# Patient Record
Sex: FEMALE | Race: WHITE | NOT HISPANIC OR LATINO | Employment: OTHER | URBAN - METROPOLITAN AREA
[De-identification: names, ages, dates, MRNs, and addresses within clinical notes are randomized per-mention and may not be internally consistent; named-entity substitution may affect disease eponyms.]

---

## 2018-12-02 ENCOUNTER — APPOINTMENT (OUTPATIENT)
Dept: GENERAL RADIOLOGY | Facility: HOSPITAL | Age: 83
End: 2018-12-02

## 2018-12-02 ENCOUNTER — APPOINTMENT (OUTPATIENT)
Dept: CT IMAGING | Facility: HOSPITAL | Age: 83
End: 2018-12-02

## 2018-12-02 ENCOUNTER — HOSPITAL ENCOUNTER (INPATIENT)
Facility: HOSPITAL | Age: 83
LOS: 4 days | Discharge: NURSING, REHAB OR LONG-TERM CARE | End: 2018-12-06
Attending: EMERGENCY MEDICINE | Admitting: INTERNAL MEDICINE

## 2018-12-02 DIAGNOSIS — Z74.09 IMPAIRED FUNCTIONAL MOBILITY AND ACTIVITY TOLERANCE: ICD-10-CM

## 2018-12-02 DIAGNOSIS — N39.0 ACUTE UTI: Primary | ICD-10-CM

## 2018-12-02 DIAGNOSIS — R62.7 FAILURE TO THRIVE IN ADULT: ICD-10-CM

## 2018-12-02 DIAGNOSIS — L89.159 PRESSURE INJURY OF SKIN OF SACRAL REGION, UNSPECIFIED INJURY STAGE: ICD-10-CM

## 2018-12-02 DIAGNOSIS — L89.891: ICD-10-CM

## 2018-12-02 PROBLEM — F03.90 DEMENTIA (HCC): Status: ACTIVE | Noted: 2018-12-02

## 2018-12-02 LAB
ALBUMIN SERPL-MCNC: 3.5 G/DL (ref 3.5–5.2)
ALBUMIN/GLOB SERPL: 1.1 G/DL
ALP SERPL-CCNC: 152 U/L (ref 39–117)
ALT SERPL W P-5'-P-CCNC: 9 U/L (ref 1–33)
ANION GAP SERPL CALCULATED.3IONS-SCNC: 9.8 MMOL/L
AST SERPL-CCNC: 21 U/L (ref 1–32)
BACTERIA UR QL AUTO: ABNORMAL /HPF
BASOPHILS # BLD AUTO: 0.01 10*3/MM3 (ref 0–0.2)
BASOPHILS NFR BLD AUTO: 0.1 % (ref 0–1.5)
BILIRUB SERPL-MCNC: 0.6 MG/DL (ref 0.1–1.2)
BILIRUB UR QL STRIP: NEGATIVE
BUN BLD-MCNC: 31 MG/DL (ref 8–23)
BUN/CREAT SERPL: 39.7 (ref 7–25)
CALCIUM SPEC-SCNC: 9 MG/DL (ref 8.6–10.5)
CHLORIDE SERPL-SCNC: 100 MMOL/L (ref 98–107)
CK SERPL-CCNC: 35 U/L (ref 20–180)
CLARITY UR: ABNORMAL
CO2 SERPL-SCNC: 24.2 MMOL/L (ref 22–29)
COLOR UR: YELLOW
CREAT BLD-MCNC: 0.78 MG/DL (ref 0.57–1)
D-LACTATE SERPL-SCNC: 1.3 MMOL/L (ref 0.5–2)
DEPRECATED RDW RBC AUTO: 47.2 FL (ref 37–54)
EOSINOPHIL # BLD AUTO: 0.02 10*3/MM3 (ref 0–0.7)
EOSINOPHIL NFR BLD AUTO: 0.3 % (ref 0.3–6.2)
ERYTHROCYTE [DISTWIDTH] IN BLOOD BY AUTOMATED COUNT: 13.5 % (ref 11.7–13)
GFR SERPL CREATININE-BSD FRML MDRD: 71 ML/MIN/1.73
GLOBULIN UR ELPH-MCNC: 3.2 GM/DL
GLUCOSE BLD-MCNC: 103 MG/DL (ref 65–99)
GLUCOSE UR STRIP-MCNC: NEGATIVE MG/DL
HCT VFR BLD AUTO: 37.9 % (ref 35.6–45.5)
HGB BLD-MCNC: 12 G/DL (ref 11.9–15.5)
HGB UR QL STRIP.AUTO: NEGATIVE
HYALINE CASTS UR QL AUTO: ABNORMAL /LPF
IMM GRANULOCYTES # BLD: 0.03 10*3/MM3 (ref 0–0.03)
IMM GRANULOCYTES NFR BLD: 0.4 % (ref 0–0.5)
INR PPP: 1.07 (ref 0.9–1.1)
KETONES UR QL STRIP: NEGATIVE
LEUKOCYTE ESTERASE UR QL STRIP.AUTO: ABNORMAL
LYMPHOCYTES # BLD AUTO: 0.99 10*3/MM3 (ref 0.9–4.8)
LYMPHOCYTES NFR BLD AUTO: 13.7 % (ref 19.6–45.3)
MCH RBC QN AUTO: 30.3 PG (ref 26.9–32)
MCHC RBC AUTO-ENTMCNC: 31.7 G/DL (ref 32.4–36.3)
MCV RBC AUTO: 95.7 FL (ref 80.5–98.2)
MONOCYTES # BLD AUTO: 0.62 10*3/MM3 (ref 0.2–1.2)
MONOCYTES NFR BLD AUTO: 8.6 % (ref 5–12)
NEUTROPHILS # BLD AUTO: 5.54 10*3/MM3 (ref 1.9–8.1)
NEUTROPHILS NFR BLD AUTO: 76.9 % (ref 42.7–76)
NITRITE UR QL STRIP: NEGATIVE
PH UR STRIP.AUTO: 6 [PH] (ref 5–8)
PLATELET # BLD AUTO: 300 10*3/MM3 (ref 140–500)
PMV BLD AUTO: 8.7 FL (ref 6–12)
POTASSIUM BLD-SCNC: 3.8 MMOL/L (ref 3.5–5.2)
PROT SERPL-MCNC: 6.7 G/DL (ref 6–8.5)
PROT UR QL STRIP: ABNORMAL
PROTHROMBIN TIME: 13.7 SECONDS (ref 11.7–14.2)
RBC # BLD AUTO: 3.96 10*6/MM3 (ref 3.9–5.2)
RBC # UR: ABNORMAL /HPF
REF LAB TEST METHOD: ABNORMAL
SODIUM BLD-SCNC: 134 MMOL/L (ref 136–145)
SP GR UR STRIP: 1.02 (ref 1–1.03)
SQUAMOUS #/AREA URNS HPF: ABNORMAL /HPF
TROPONIN T SERPL-MCNC: <0.01 NG/ML (ref 0–0.03)
UROBILINOGEN UR QL STRIP: ABNORMAL
WBC NRBC COR # BLD: 7.21 10*3/MM3 (ref 4.5–10.7)
WBC UR QL AUTO: ABNORMAL /HPF

## 2018-12-02 PROCEDURE — 85025 COMPLETE CBC W/AUTO DIFF WBC: CPT | Performed by: EMERGENCY MEDICINE

## 2018-12-02 PROCEDURE — 87186 SC STD MICRODIL/AGAR DIL: CPT | Performed by: EMERGENCY MEDICINE

## 2018-12-02 PROCEDURE — 87040 BLOOD CULTURE FOR BACTERIA: CPT | Performed by: EMERGENCY MEDICINE

## 2018-12-02 PROCEDURE — 83605 ASSAY OF LACTIC ACID: CPT | Performed by: EMERGENCY MEDICINE

## 2018-12-02 PROCEDURE — 87077 CULTURE AEROBIC IDENTIFY: CPT | Performed by: EMERGENCY MEDICINE

## 2018-12-02 PROCEDURE — 25010000002 CEFTRIAXONE PER 250 MG: Performed by: EMERGENCY MEDICINE

## 2018-12-02 PROCEDURE — 80053 COMPREHEN METABOLIC PANEL: CPT | Performed by: EMERGENCY MEDICINE

## 2018-12-02 PROCEDURE — 71046 X-RAY EXAM CHEST 2 VIEWS: CPT

## 2018-12-02 PROCEDURE — 70450 CT HEAD/BRAIN W/O DYE: CPT

## 2018-12-02 PROCEDURE — 85610 PROTHROMBIN TIME: CPT | Performed by: EMERGENCY MEDICINE

## 2018-12-02 PROCEDURE — 36415 COLL VENOUS BLD VENIPUNCTURE: CPT

## 2018-12-02 PROCEDURE — 99284 EMERGENCY DEPT VISIT MOD MDM: CPT

## 2018-12-02 PROCEDURE — 82550 ASSAY OF CK (CPK): CPT | Performed by: EMERGENCY MEDICINE

## 2018-12-02 PROCEDURE — 87086 URINE CULTURE/COLONY COUNT: CPT | Performed by: EMERGENCY MEDICINE

## 2018-12-02 PROCEDURE — 84484 ASSAY OF TROPONIN QUANT: CPT | Performed by: EMERGENCY MEDICINE

## 2018-12-02 PROCEDURE — 93005 ELECTROCARDIOGRAM TRACING: CPT | Performed by: EMERGENCY MEDICINE

## 2018-12-02 PROCEDURE — 81001 URINALYSIS AUTO W/SCOPE: CPT | Performed by: EMERGENCY MEDICINE

## 2018-12-02 PROCEDURE — 93010 ELECTROCARDIOGRAM REPORT: CPT | Performed by: INTERNAL MEDICINE

## 2018-12-02 PROCEDURE — 73630 X-RAY EXAM OF FOOT: CPT

## 2018-12-02 RX ORDER — CEFTRIAXONE SODIUM 1 G/50ML
1 INJECTION, SOLUTION INTRAVENOUS ONCE
Status: COMPLETED | OUTPATIENT
Start: 2018-12-02 | End: 2018-12-02

## 2018-12-02 RX ORDER — SODIUM CHLORIDE 0.9 % (FLUSH) 0.9 %
10 SYRINGE (ML) INJECTION AS NEEDED
Status: DISCONTINUED | OUTPATIENT
Start: 2018-12-02 | End: 2018-12-06 | Stop reason: HOSPADM

## 2018-12-02 RX ADMIN — CEFTRIAXONE SODIUM 1 G: 1 INJECTION, SOLUTION INTRAVENOUS at 21:11

## 2018-12-02 RX ADMIN — SODIUM CHLORIDE 1000 ML: 9 INJECTION, SOLUTION INTRAVENOUS at 19:33

## 2018-12-02 NOTE — ED NOTES
Pt reports she fell and broke her wrist 2 weeks ago and has decreased appetite and oral intake since. Pt's family member is worried that she is dehydrated.      Danitza Landis RN  12/02/18 3434

## 2018-12-02 NOTE — ED PROVIDER NOTES
EMERGENCY DEPARTMENT ENCOUNTER    CHIEF COMPLAINT  Chief Complaint: decrease appetite   History given by: pt and son  History limited by: none  Room Number: 15/15  PMD: System, Provider Not In      HPI:  Pt is a 83 y.o. female who presents after the pt has been having decreased appetite for the past 2 weeks. Per son, pt had  fallen and broken her left wrist  2 weeks ago.  It was reduced and cast by hand surgery and it did not need a ORIF. He has noticed that patient has been staying in bed for 2 weeks with decrease appetite and decreased urine output. He continues to say that the pt has not eaten for 2 days from going to 2 pop tart a day to one to none since Friday. Pt drinks a glass of water a day. Pt has been more generally weak and falling. During the fall the pt did not hit her head or have LOC. Pt denies nausea or vomiting. Pt denies smoking or drinking.    Duration:  2 weeks  Onset: gradual  Timing: constant  Location: n/a  Radiation: n/a  Quality: n/a  Intensity/Severity: moderate  Progression: worsened  Associated Symptoms: generalized weakness  Aggravating Factors: na  Alleviating Factors: n/a  Previous Episodes: n/a  Treatment before arrival: n/a    PAST MEDICAL HISTORY  Active Ambulatory Problems     Diagnosis Date Noted   • No Active Ambulatory Problems     Resolved Ambulatory Problems     Diagnosis Date Noted   • No Resolved Ambulatory Problems     Past Medical History:   Diagnosis Date   • Dementia    • Wrist fracture        PAST SURGICAL HISTORY  Past Surgical History:   Procedure Laterality Date   • TOTAL HIP ARTHROPLASTY         FAMILY HISTORY  History reviewed. No pertinent family history.    SOCIAL HISTORY  Social History     Socioeconomic History   • Marital status:      Spouse name: Not on file   • Number of children: Not on file   • Years of education: Not on file   • Highest education level: Not on file   Social Needs   • Financial resource strain: Not on file   • Food insecurity -  worry: Not on file   • Food insecurity - inability: Not on file   • Transportation needs - medical: Not on file   • Transportation needs - non-medical: Not on file   Occupational History   • Not on file   Tobacco Use   • Smoking status: Not on file   Substance and Sexual Activity   • Alcohol use: Not on file   • Drug use: Not on file   • Sexual activity: Not on file   Other Topics Concern   • Not on file   Social History Narrative   • Not on file       ALLERGIES  Patient has no known allergies.    REVIEW OF SYSTEMS  Review of Systems   Constitutional: Positive for appetite change (decrease). Negative for fever.   HENT: Negative for sore throat.    Eyes: Negative.    Respiratory: Negative for cough and shortness of breath.    Cardiovascular: Negative for chest pain.   Gastrointestinal: Negative for abdominal pain, diarrhea and vomiting.   Genitourinary: Negative for dysuria.   Musculoskeletal: Negative for neck pain.   Skin: Negative for rash.   Allergic/Immunologic: Negative.    Neurological: Positive for weakness (generalized ). Negative for numbness and headaches.   Hematological: Negative.    Psychiatric/Behavioral: Negative.    All other systems reviewed and are negative.      PHYSICAL EXAM  ED Triage Vitals [12/02/18 1800]   Temp Heart Rate Resp BP SpO2   97.6 °F (36.4 °C) 85 18 123/64 100 %      Temp src Heart Rate Source Patient Position BP Location FiO2 (%)   Oral Monitor Lying Right arm --       Physical Exam   Constitutional: She is oriented to person, place, and time. No distress.   Bitemporal wasting. cachectic  appearing   HENT:   Head: Normocephalic and atraumatic.   Eyes: EOM are normal. Pupils are equal, round, and reactive to light.   Neck: Normal range of motion. Neck supple.   Cardiovascular: Normal rate, regular rhythm and normal heart sounds.   Pulmonary/Chest: Effort normal and breath sounds normal. No respiratory distress.   Abdominal: Soft. There is no tenderness. There is no rebound and no  guarding.   Musculoskeletal: Normal range of motion. She exhibits no edema.        Left hip: She exhibits tenderness.   Neurological: She is alert and oriented to person, place, and time. She has normal sensation and normal strength.   Skin: Skin is warm and dry. No rash noted.   2 cm ulcer on the left foot with eschar. No erythema or warmth. Sacrum has 6 x 4 cm on pressure ulcer with eschar present.   Psychiatric: Mood and affect normal.   Nursing note and vitals reviewed.      LAB RESULTS  Lab Results (last 24 hours)     Procedure Component Value Units Date/Time    CBC & Differential [[id number]] Collected:  12/02/18 1929    Specimen:  Blood Updated:  12/02/18 1948    Narrative:       The following orders were created for panel order CBC & Differential.  Procedure                               Abnormality         Status                     ---------                               -----------         ------                     CBC Auto Differential[[id number]]        Abnormal            Final result                 Please view results for these tests on the individual orders.    Blood Culture - Blood, Arm, Left [[id number]] Collected:  12/02/18 1929    Specimen:  Blood from Arm, Left Updated:  12/02/18 1940    Lactic Acid, Plasma [[id number]]  (Normal) Collected:  12/02/18 1929    Specimen:  Blood Updated:  12/02/18 2001     Lactate 1.3 mmol/L     CK [[id number]]  (Normal) Collected:  12/02/18 1929    Specimen:  Blood Updated:  12/02/18 2010     Creatine Kinase 35 U/L     Troponin [[id number]]  (Normal) Collected:  12/02/18 1929    Specimen:  Blood Updated:  12/02/18 2010     Troponin T <0.010 ng/mL     Narrative:       Troponin T Reference Ranges:  Less than 0.03 ng/mL:    Negative for AMI  0.03 to 0.09 ng/mL:      Indeterminant for AMI  Greater than 0.09 ng/mL: Positive for AMI    Comprehensive Metabolic Panel [[id number]]  (Abnormal) Collected:  12/02/18 1929    Specimen:  Blood Updated:  12/02/18 2010     Glucose 103  mg/dL      BUN 31 mg/dL      Creatinine 0.78 mg/dL      Sodium 134 mmol/L      Potassium 3.8 mmol/L      Chloride 100 mmol/L      CO2 24.2 mmol/L      Calcium 9.0 mg/dL      Total Protein 6.7 g/dL      Albumin 3.50 g/dL      ALT (SGPT) 9 U/L      AST (SGOT) 21 U/L      Alkaline Phosphatase 152 U/L      Total Bilirubin 0.6 mg/dL      eGFR Non African Amer 71 mL/min/1.73      Globulin 3.2 gm/dL      A/G Ratio 1.1 g/dL      BUN/Creatinine Ratio 39.7     Anion Gap 9.8 mmol/L     Narrative:       The MDRD GFR formula is only valid for adults with stable renal function between ages 18 and 70.    Protime-INR [[id number]]  (Normal) Collected:  12/02/18 1929    Specimen:  Blood from Arm, Right Updated:  12/02/18 1958     Protime 13.7 Seconds      INR 1.07    CBC Auto Differential [[id number]]  (Abnormal) Collected:  12/02/18 1929    Specimen:  Blood Updated:  12/02/18 1948     WBC 7.21 10*3/mm3      RBC 3.96 10*6/mm3      Hemoglobin 12.0 g/dL      Hematocrit 37.9 %      MCV 95.7 fL      MCH 30.3 pg      MCHC 31.7 g/dL      RDW 13.5 %      RDW-SD 47.2 fl      MPV 8.7 fL      Platelets 300 10*3/mm3      Neutrophil % 76.9 %      Lymphocyte % 13.7 %      Monocyte % 8.6 %      Eosinophil % 0.3 %      Basophil % 0.1 %      Immature Grans % 0.4 %      Neutrophils, Absolute 5.54 10*3/mm3      Lymphocytes, Absolute 0.99 10*3/mm3      Monocytes, Absolute 0.62 10*3/mm3      Eosinophils, Absolute 0.02 10*3/mm3      Basophils, Absolute 0.01 10*3/mm3      Immature Grans, Absolute 0.03 10*3/mm3     Urinalysis With Culture If Indicated - Urine, Catheter In/Out [[id number]]  (Abnormal) Collected:  12/02/18 1954    Specimen:  Urine, Catheter In/Out Updated:  12/02/18 2008     Color, UA Yellow     Appearance, UA Cloudy     pH, UA 6.0     Specific Gravity, UA 1.020     Glucose, UA Negative     Ketones, UA Negative     Bilirubin, UA Negative     Blood, UA Negative     Protein, UA Trace     Leuk Esterase, UA Moderate (2+)     Nitrite, UA Negative      Urobilinogen, UA 1.0 E.U./dL    Urinalysis, Microscopic Only - Urine, Catheter In/Out [[id number]]  (Abnormal) Collected:  12/02/18 1954    Specimen:  Urine, Catheter In/Out Updated:  12/02/18 2008     RBC, UA 0-2 /HPF      WBC, UA 31-50 /HPF      Bacteria, UA 4+ /HPF      Squamous Epithelial Cells, UA 0-2 /HPF      Hyaline Casts, UA 0-2 /LPF      Methodology Automated Microscopy    Urine Culture - Urine, Urine, Catheter In/Out [[id number]] Collected:  12/02/18 1954    Specimen:  Urine, Catheter In/Out Updated:  12/02/18 2008    Blood Culture - Blood, Arm, Left [[id number]] Collected:  12/02/18 2101    Specimen:  Blood from Arm, Left Updated:  12/02/18 2111          I ordered the above labs and reviewed the results    RADIOLOGY  CT Head Without Contrast   Final Result   No acute intracranial process.       Radiation dose reduction techniques were utilized, including automated   exposure control and exposure modulation based on body size.       This report was finalized on 12/2/2018 9:15 PM by Dr. Shy Fermin M.D.          XR Chest 2 View   Final Result   Blunting of the right costophrenic angle may reflect scarring or trace   effusion. No definite acute infiltrates are seen, although the patient   does have background emphysematous changes.       This report was finalized on 12/2/2018 8:39 PM by Dr. Shy Fermin M.D.          XR Foot 3+ View Left   Final Result   Soft tissue defect seen overlying the posterior aspect of the calcaneus,   without underlying cortical abnormality to suggest osteomyelitis.       This report was finalized on 12/2/2018 8:37 PM by Dr. Shy Fermin M.D.               I ordered the above noted radiological studies. Interpreted by radiologist. Reviewed by me in PACS.       PROCEDURES  Procedures  EKG          EKG time: 1932  Rhythm/Rate: NSR, 83  P waves and GA: nml  QRS, axis: nml   ST and T waves: nml     Interpreted Contemporaneously by me, independently viewed  No  priors      PROGRESS AND CONSULTS   1916- Ordered  IVF, XR foot, CXR, CT head, UA, blood culture, Lactic acid, CK, Troponin, CMP, Protime, UA, CBC    2040- Ordered Rocephin for infection.    2123-Discussed pt case with Dr. Hayes ([facility]) who will admit the pt to a tele bed.    2150- Rechecked pt. Pt is resting comfortably. Notified pt of labs and radiology results. Discussed the plan to admit the pt to the hospital for work-up and treatment. Pt understands and agrees with the plan, all questions answered.      MEDICAL DECISION MAKING  Results were reviewed/discussed with the patient and they were also made aware of online access. Pt also made aware that some labs, such as cultures, will not be resulted during ER visit and follow up with PMD is necessary.     MDM  Number of Diagnoses or Management Options  Acute UTI:   Failure to thrive in adult:   Pressure injury of dorsum of left foot, stage 1:   Pressure injury of skin of sacral region, unspecified injury stage:      Amount and/or Complexity of Data Reviewed  Clinical lab tests: ordered and reviewed (UA- positive for WBC  Troponin- negative  CK- 35  Troponin- negative)  Tests in the radiology section of CPT®: ordered and reviewed (CT head- nothing acute  XR foot- Soft tissue defect seen overlying the posterior aspect of the calcaneus,  without underlying cortical abnormality to suggest osteomyelitis.  CXR- Blunting of the right costophrenic angle may reflect scarring or trace  effusion. No definite acute infiltrates are seen, although the patient  does have background emphysematous changes.  )  Tests in the medicine section of CPT®: ordered and reviewed (See procedure note for EKG  )  Decide to obtain previous medical records or to obtain history from someone other than the patient: yes  Obtain history from someone other than the patient: yes (Son  )  Discuss the patient with other providers: yes (Dr. Hayes ([facility]))  Independent visualization of images,  tracings, or specimens: yes           DIAGNOSIS  Final diagnoses:   Acute UTI   Failure to thrive in adult   Pressure injury of skin of sacral region, unspecified injury stage   Pressure injury of dorsum of left foot, stage 1       DISPOSITION  ADMISSION    Discussed treatment plan and reason for admission with pt/family and admitting physician.  Pt/family voiced understanding of the plan for admission for further testing/treatment as needed.         Latest Documented Vital Signs:  As of 9:43 PM  BP- 150/75 HR- 67 Temp- 97.6 °F (36.4 °C) (Oral) O2 sat- 100%    --  Documentation assistance provided by mark Mac for Dr. Espitia.  Information recorded by the scribe was done at my direction and has been verified and validated by me.             Delvin Mac  12/02/18 1074       Suman Espitia MD  12/02/18 5625

## 2018-12-03 PROBLEM — E87.1 HYPONATREMIA: Status: ACTIVE | Noted: 2018-12-03

## 2018-12-03 PROBLEM — L89.90 PRESSURE ULCER: Status: ACTIVE | Noted: 2018-12-03

## 2018-12-03 LAB
ANION GAP SERPL CALCULATED.3IONS-SCNC: 13.1 MMOL/L
BUN BLD-MCNC: 25 MG/DL (ref 8–23)
BUN/CREAT SERPL: 50 (ref 7–25)
CALCIUM SPEC-SCNC: 8.5 MG/DL (ref 8.6–10.5)
CHLORIDE SERPL-SCNC: 104 MMOL/L (ref 98–107)
CO2 SERPL-SCNC: 19.9 MMOL/L (ref 22–29)
CREAT BLD-MCNC: 0.5 MG/DL (ref 0.57–1)
DEPRECATED RDW RBC AUTO: 46.8 FL (ref 37–54)
ERYTHROCYTE [DISTWIDTH] IN BLOOD BY AUTOMATED COUNT: 13.6 % (ref 11.7–13)
GFR SERPL CREATININE-BSD FRML MDRD: 118 ML/MIN/1.73
GLUCOSE BLD-MCNC: 87 MG/DL (ref 65–99)
HCT VFR BLD AUTO: 37.6 % (ref 35.6–45.5)
HGB BLD-MCNC: 11.9 G/DL (ref 11.9–15.5)
MCH RBC QN AUTO: 29.8 PG (ref 26.9–32)
MCHC RBC AUTO-ENTMCNC: 31.6 G/DL (ref 32.4–36.3)
MCV RBC AUTO: 94 FL (ref 80.5–98.2)
PLATELET # BLD AUTO: 256 10*3/MM3 (ref 140–500)
PMV BLD AUTO: 8.9 FL (ref 6–12)
POTASSIUM BLD-SCNC: 3.7 MMOL/L (ref 3.5–5.2)
RBC # BLD AUTO: 4 10*6/MM3 (ref 3.9–5.2)
SODIUM BLD-SCNC: 137 MMOL/L (ref 136–145)
WBC NRBC COR # BLD: 7.09 10*3/MM3 (ref 4.5–10.7)

## 2018-12-03 PROCEDURE — 25010000002 CEFTRIAXONE PER 250 MG: Performed by: NURSE PRACTITIONER

## 2018-12-03 PROCEDURE — 36415 COLL VENOUS BLD VENIPUNCTURE: CPT | Performed by: NURSE PRACTITIONER

## 2018-12-03 PROCEDURE — 97165 OT EVAL LOW COMPLEX 30 MIN: CPT

## 2018-12-03 PROCEDURE — 97162 PT EVAL MOD COMPLEX 30 MIN: CPT

## 2018-12-03 PROCEDURE — 85027 COMPLETE CBC AUTOMATED: CPT | Performed by: NURSE PRACTITIONER

## 2018-12-03 PROCEDURE — 97110 THERAPEUTIC EXERCISES: CPT

## 2018-12-03 PROCEDURE — 80048 BASIC METABOLIC PNL TOTAL CA: CPT | Performed by: NURSE PRACTITIONER

## 2018-12-03 RX ORDER — ACETAMINOPHEN 325 MG/1
650 TABLET ORAL EVERY 4 HOURS PRN
Status: DISCONTINUED | OUTPATIENT
Start: 2018-12-03 | End: 2018-12-06 | Stop reason: HOSPADM

## 2018-12-03 RX ORDER — SODIUM CHLORIDE 9 MG/ML
50 INJECTION, SOLUTION INTRAVENOUS CONTINUOUS
Status: DISCONTINUED | OUTPATIENT
Start: 2018-12-03 | End: 2018-12-04

## 2018-12-03 RX ORDER — ONDANSETRON 4 MG/1
4 TABLET, ORALLY DISINTEGRATING ORAL EVERY 6 HOURS PRN
Status: DISCONTINUED | OUTPATIENT
Start: 2018-12-03 | End: 2018-12-06 | Stop reason: HOSPADM

## 2018-12-03 RX ORDER — ONDANSETRON 2 MG/ML
4 INJECTION INTRAMUSCULAR; INTRAVENOUS EVERY 6 HOURS PRN
Status: DISCONTINUED | OUTPATIENT
Start: 2018-12-03 | End: 2018-12-06 | Stop reason: HOSPADM

## 2018-12-03 RX ORDER — SODIUM CHLORIDE 0.9 % (FLUSH) 0.9 %
3 SYRINGE (ML) INJECTION EVERY 12 HOURS SCHEDULED
Status: DISCONTINUED | OUTPATIENT
Start: 2018-12-03 | End: 2018-12-06 | Stop reason: HOSPADM

## 2018-12-03 RX ORDER — CEFTRIAXONE SODIUM 1 G/50ML
1 INJECTION, SOLUTION INTRAVENOUS EVERY 24 HOURS
Status: COMPLETED | OUTPATIENT
Start: 2018-12-03 | End: 2018-12-04

## 2018-12-03 RX ORDER — NITROGLYCERIN 0.4 MG/1
0.4 TABLET SUBLINGUAL
Status: DISCONTINUED | OUTPATIENT
Start: 2018-12-03 | End: 2018-12-06 | Stop reason: HOSPADM

## 2018-12-03 RX ORDER — SODIUM CHLORIDE 0.9 % (FLUSH) 0.9 %
1-10 SYRINGE (ML) INJECTION AS NEEDED
Status: DISCONTINUED | OUTPATIENT
Start: 2018-12-03 | End: 2018-12-06 | Stop reason: HOSPADM

## 2018-12-03 RX ORDER — ONDANSETRON 4 MG/1
4 TABLET, FILM COATED ORAL EVERY 6 HOURS PRN
Status: DISCONTINUED | OUTPATIENT
Start: 2018-12-03 | End: 2018-12-06 | Stop reason: HOSPADM

## 2018-12-03 RX ADMIN — SODIUM CHLORIDE 100 ML/HR: 9 INJECTION, SOLUTION INTRAVENOUS at 01:30

## 2018-12-03 RX ADMIN — COLLAGENASE SANTYL: 250 OINTMENT TOPICAL at 14:00

## 2018-12-03 RX ADMIN — SODIUM CHLORIDE, PRESERVATIVE FREE 3 ML: 5 INJECTION INTRAVENOUS at 01:30

## 2018-12-03 RX ADMIN — SODIUM CHLORIDE, PRESERVATIVE FREE 3 ML: 5 INJECTION INTRAVENOUS at 10:26

## 2018-12-03 RX ADMIN — SODIUM CHLORIDE, PRESERVATIVE FREE 3 ML: 5 INJECTION INTRAVENOUS at 21:00

## 2018-12-03 RX ADMIN — DAKIN'S SOLUTION 0.125% (QUARTER STRENGTH) 946 ML: 0.12 SOLUTION at 20:50

## 2018-12-03 RX ADMIN — DAKIN'S SOLUTION 0.125% (QUARTER STRENGTH) 946 ML: 0.12 SOLUTION at 14:00

## 2018-12-03 RX ADMIN — CEFTRIAXONE SODIUM 1 G: 1 INJECTION, SOLUTION INTRAVENOUS at 20:50

## 2018-12-03 RX ADMIN — COLLAGENASE SANTYL: 250 OINTMENT TOPICAL at 20:50

## 2018-12-03 NOTE — NURSING NOTE
[facility] report filled out, called, and faxed to [facility].  notified. See report for evidence.  Case #[id number]

## 2018-12-03 NOTE — THERAPY EVALUATION
"Acute Care - Occupational Therapy Initial Evaluation  [facility]     Patient Name: Sarah Jeffrey  : 1935  MRN: [id number]  Today's Date: 12/3/2018  Onset of Illness/Injury or Date of Surgery: 18     Referring Physician: Mellisa    Admit Date: 2018       ICD-10-CM ICD-9-CM   1. Acute UTI N39.0 599.0   2. Failure to thrive in adult R62.7 783.7   3. Pressure injury of skin of sacral region, unspecified injury stage L89.159 707.03     707.20   4. Pressure injury of dorsum of left foot, stage 1 L89.891 707.09     707.21   5. Impaired functional mobility and activity tolerance Z74.09 V49.89     Patient Active Problem List   Diagnosis   • Acute UTI   • Dementia   • Pressure ulcer   • Hyponatremia     Past Medical History:   Diagnosis Date   • Dementia    • Wrist fracture      Past Surgical History:   Procedure Laterality Date   • TOTAL HIP ARTHROPLASTY            OT ASSESSMENT FLOWSHEET (last 72 hours)      Occupational Therapy Evaluation     Row Name 18 1009                   OT Evaluation Time/Intention    Subjective Information  complains of;weakness;fatigue  -LE        Document Type  evaluation;therapy note (daily note)  -LE        Patient Effort  fair  -LE        Comment  encouragement and explanation of activity benefits given  -LE           General Information    Patient Profile Reviewed?  yes  -LE        Patient Observations  lethargic  -LE        General Observations of Patient  sidelying R in bed, very thin, frail.  room dark  -LE        Prior Level of Function  -- pt reports walk to BR/ADL on own, ? reliable historian.   -LE        Barriers to Rehab  -- per son to nsg: pt stays in bed/recliner mostly  -LE           Cognitive Assessment/Intervention- PT/OT    Orientation Status (Cognition)  oriented to;person month, \"Rastafari\"  -LE        Follows Commands (Cognition)  follows two step commands;0-24% accuracy;delayed response/completion;increased processing time needed;initiation " impaired;physical/tactile prompts required;verbal cues/prompting required;repetition of directions required  -LE        Cognitive Assessment/Intervention Comment  pt reluctant to get up, minimal talking  -LE           Bed Mobility Assessment/Treatment    Supine-Sit [place] (Bed Mobility)  moderate assist (50% patient effort)  -LE        Sit-Supine [place] (Bed Mobility)  moderate assist (50% patient effort)  -LE        Comment (Bed Mobility)  assist pt, care taken to avoid pressure areas  -LE           Functional Mobility    Functional Mobility- Ind. Level  not tested;unable to perform;not appropriate to assess due poor standing balance and pt decline to attempt  -LE           Sit-Stand Transfer    Sit-Stand [place] (Transfers)  moderate assist (50% patient effort)  -LE        Assistive Device (Sit-Stand Transfers)  -- B feet blocked to keep from extending.  2 attempt to achieve  -LE           Stand-Sit Transfer    Stand-Sit [place] (Transfers)  moderate assist (50% patient effort)  -LE           ADL Assessment/Intervention    BADL Assessment/Intervention  bathing;upper body dressing;lower body dressing;grooming;feeding;toileting  -LE        Additional Documentation  -- pt receiving assist all ADL, RN states bedpan used.   -LE           Lower Body Dressing Assessment/Training    Comment (Lower Body Dressing)  unable to reach feet.   -LE           Toileting Assessment/Training    Comment (Toileting)  no brief on. RN states using bedpan.   -LE           General ROM    GENERAL ROM COMMENTS  L wrist cast (h/o fall 2 weeks ago).  grossly 2/3 AROM distal, 1/2 B shld.    -LE           MMT (Manual Muscle Testing)    General MMT Comments  -- minimal effort to raise arms  or take resistance.   -LE           Static Sitting Balance    Level of [place] (Unsupported Sitting, Static Balance)  minimal assist, 75% patient effort posterior and R lean, tries to lay self back down while seat  -LE        Sitting  Position (Unsupported Sitting, Static Balance)  sitting on edge of bed  -LE           Static Standing Balance    Level of [place] (Supported Standing, Static Balance)  moderate assist, 50 to 74% patient effort brief stand at EOB  -LE           Positioning and Restraints    Pre-Treatment Position  in bed  -LE        Post Treatment Position  bed  -LE        In Bed  side lying right;call light within reach;encouraged to call for assist;pillow between legs;R waffle boot WOCN took off L boot before OT.  pt lay on waffle cushion  -LE           Pain Assessment    Additional Documentation  Pain Scale: FACES Pre/Post-Treatment (Group)  -LE           Pain Scale: FACES Pre/Post-Treatment    Pain: FACES Scale, Pretreatment  4-->hurts little more  -LE        Pain: FACES Scale, Post-Treatment  4-->hurts little more brief during bed mobility.  indicates leg pain  -LE        Pre/Post Treatment Pain Comment  reposition and rest  -LE           Wound 12/02/18 2045 Other (See comments) midline coccyx pressure injury    Wound - Properties Group Date first assessed: 12/02/18  -GC Time first assessed: 2045 -GC Present On Admission : yes  -GC Side: Other (See comments)  -GC Orientation: midline  -GC Location: coccyx  -GC Type: pressure injury  -GC Stage, Pressure Injury: unstageable  -GC       Wound 12/02/18 2046 Right heel pressure injury    Wound - Properties Group Date first assessed: 12/02/18  -GC Time first assessed: 2046  -GC Present On Admission : yes  -GC Side: Right  -GC Location: heel  -GC Type: pressure injury  -GC Stage, Pressure Injury: unstageable  -GC       Wound 12/03/18 0139 Left heel pressure injury    Wound - Properties Group Date first assessed: 12/03/18  -EN Time first assessed: 0139  -EN Present On Admission : yes;picture taken  -EN Side: Left  -EN Location: heel  -EN Type: pressure injury  -EN Stage, Pressure Injury: unstageable  -EN       Plan of Care Review    Plan of Care Reviewed With  patient  -LE            Clinical Impression (OT)    OT Diagnosis  need for assist with personal care  -LE        Patient/Family Goals Statement (OT Eval)  increase function  -LE        Criteria for Skilled Therapeutic Interventions Met (OT Eval)  treatment indicated;yes  -LE        Rehab Potential (OT Eval)  fair, will monitor progress closely  -LE        Therapy Frequency (OT Eval)  5 times/wk  -LE        Care Plan Review (OT)  evaluation/treatment results reviewed;care plan/treatment goals reviewed  -LE        Anticipated Equipment Needs at Discharge (OT)  bedside commode;walker  -LE        Anticipated Discharge Disposition (OT)  -- pending progress, clarify prior level, family support  -LE           Vital Signs    O2 Delivery Pre Treatment  supplemental O2  -LE        O2 Delivery Intra Treatment  supplemental O2  -LE        O2 Delivery Post Treatment  supplemental O2  -LE        Pre Patient Position  Side Lying  -LE        Intra Patient Position  Standing  -LE        Post Patient Position  Side Lying  -LE        Activity Duration  -- frail, minimal activity, pt reluctant to get up  -LE           Planned OT Interventions    Planned Therapy Interventions (OT Eval)  activity tolerance training;adaptive equipment training;BADL retraining;functional balance retraining;transfer/mobility retraining;strengthening exercise  -LE           OT Goals    Transfer Goal Selection (OT)  transfer, OT goal 1  -LE        Bathing Goal Selection (OT)  bathing, OT goal 1  -LE        Dressing Goal Selection (OT)  dressing, OT goal 1  -LE        Toileting Goal Selection (OT)  toileting, OT goal 1  -LE           Transfer Goal 1 (OT)    Activity/Assistive Device (Transfer Goal 1, OT)  sit-to-stand/stand-to-sit;bed-to-chair/chair-to-bed;toilet;commode, 3-in-1;walker, rolling  -LE        [place] Level/Cues Needed (Transfer Goal 1, OT)  minimum assist (75% or more patient effort)  -LE        Time Frame (Transfer Goal 1, OT)  1 week  -LE         Progress/Outcome (Transfer Goal 1, OT)  goal ongoing  -LE           Bathing Goal 1 (OT)    Activity/Assistive Device (Bathing Goal 1, OT)  upper body bathing;lower body bathing  -LE        [place] Level/Cues Needed (Bathing Goal 1, OT)  moderate assist (50-74% patient effort)  -LE        Time Frame (Bathing Goal 1, OT)  1 week  -LE        Progress/Outcomes (Bathing Goal 1, OT)  goal ongoing  -LE           Dressing Goal 1 (OT)    Activity/Assistive Device (Dressing Goal 1, OT)  lower body dressing  -LE        [place]/Cues Needed (Dressing Goal 1, OT)  moderate assist (50-74% patient effort)  -LE        Time Frame (Dressing Goal 1, OT)  1 week  -LE        Progress/Outcome (Dressing Goal 1, OT)  goal ongoing  -LE           Toileting Goal 1 (OT)    Activity/Device (Toileting Goal 1, OT)  perform perineal hygiene;commode, 3-in-1  -LE        [place] Level/Cues Needed (Toileting Goal 1, OT)  minimum assist (75% or more patient effort)  -LE        Time Frame (Toileting Goal 1, OT)  1 week  -LE        Progress/Outcome (Toileting Goal 1, OT)  goal ongoing  -LE          User Key  (r) = Recorded By, (t) = Taken By, (c) = Cosigned By    Initials Name Effective Dates    Sofiya Collins, OTR 06/08/18 -     Ana Blackwood, JOHN 10/30/17 -     Nelia Stapleton, JOHN 07/19/17 -          Occupational Therapy Education     Title: PT OT SLP Therapies (In Progress)     Topic: Occupational Therapy (In Progress)     Point: Precautions (Done)     Description: Instruct learner(s) on prescribed precautions during self-care and functional transfers.    Learning Progress Summary           Patient Acceptance, E, VU by BRIT at 12/3/2018 10:09 AM    Comment:  Ed benefits of activity.    Acceptance, E,VALE, DU by BIRT at 12/3/2018 10:08 AM    Comment:  Ed role of OT in acute care.  Ed posture with standing to improve stability and pt responds to verbal and tactile cueing                   Point: Body mechanics (Done)      Description: Instruct learner(s) on proper positioning and spine alignment during self-care, functional mobility activities and/or exercises.    Learning Progress Summary           Patient Acceptance, VALE BLANCA, DU by BRIT at 12/3/2018 10:08 AM    Comment:  Ed role of OT in acute care.  Ed posture with standing to improve stability and pt responds to verbal and tactile cueing                               User Key     Initials Effective Dates Name Provider Type Discipline    BRIT 06/08/18 -  Sofiya High, OTR Occupational Therapist OT                  OT Recommendation and Plan  Outcome Summary/Treatment Plan (OT)  Anticipated Equipment Needs at Discharge (OT): bedside commode, walker  Anticipated Discharge Disposition (OT): (pending progress, clarify prior level, family support)  Planned Therapy Interventions (OT Eval): activity tolerance training, adaptive equipment training, BADL retraining, functional balance retraining, transfer/mobility retraining, strengthening exercise  Therapy Frequency (OT Eval): 5 times/wk  Plan of Care Review  Plan of Care Reviewed With: patient  Plan of Care Reviewed With: patient  Outcome Summary: Pt requires encouragement to get up to EOB with max A.  Pt frail, not initiating tasks.  Pt may benefit from skilled OT to increase safety and independence.     Outcome Measures     Row Name 12/03/18 1002 12/03/18 1000 12/03/18 0900       How much help from another person do you currently need...    Turning from your back to your side while in flat bed without using bedrails?  --  --  2  -PC    Moving from lying on back to sitting on the side of a flat bed without bedrails?  --  --  2  -PC    Moving to and from a bed to a chair (including a wheelchair)?  --  --  2  -PC    Standing up from a chair using your arms (e.g., wheelchair, bedside chair)?  --  --  2  -PC    Climbing 3-5 steps with a railing?  --  --  1  -PC    To walk in hospital room?  --  --  1  -PC    AM-PAC 6 Clicks Score  --  --  10   -PC       How much help from another is currently needed...    Putting on and taking off regular lower body clothing?  1  -LE  --  --    Bathing (including washing, rinsing, and drying)  1  -LE  --  --    Toileting (which includes using toilet bed pan or urinal)  1  -LE  --  --    Putting on and taking off regular upper body clothing  1  -LE  --  --    Taking care of personal grooming (such as brushing teeth)  1  -LE  --  --    Eating meals  2  -LE  --  --    Score  7  -LE  --  --       Functional Assessment    Outcome Measure Options  --  AM-PAC 6 Clicks Daily Activity (OT)  -LE  AM-PAC 6 Clicks Basic Mobility (PT)  -PC      User Key  (r) = Recorded By, (t) = Taken By, (c) = Cosigned By    Initials Name Provider Type    Sofiya Collins OTR Occupational Therapist    PC Diana Arias, PT Physical Therapist          Time Calculation:   Time Calculation- OT     Row Name 12/03/18 1019             Time Calculation- OT    OT Start Time  0947  -LE      OT Stop Time  0958  -LE      OT Time Calculation (min)  11 min  -LE      Total Timed Code Minutes- OT  11 minute(s)  -LE      OT Received On  12/03/18  -      OT Goal Re-Cert Due Date  12/10/18  -        User Key  (r) = Recorded By, (t) = Taken By, (c) = Cosigned By    Initials Name Provider Type    Sofiya Collins OTR Occupational Therapist        Therapy Suggested Charges     Code   Minutes Charges    None           Therapy Charges for Today     Code Description Service Date Service Provider Modifiers Qty    [id number]  OT EVAL LOW COMPLEXITY 2 12/3/2018 Sofiya High OTR GO 1               ANA Cartwright  12/3/2018

## 2018-12-03 NOTE — H&P
Name: Sarah Jeffrey ADMIT: 2018   : 1935  PCP: System, Provider Not In    MRN: [id number] LOS: 1 days   AGE/SEX: 83 y.o. female  ROOM: N535/1     Chief Complaint   Patient presents with   • Anorexia       Subjective     Ms. Jeffrey is a 83 y.o. female with a history of dementia that presents to [facility] with family complaints of increased confusion. Patient lives with son at home and patient's son who is currently at bedside reports that his mother hasn't been eating for the past 2 days and has hpill confused, which is not her baseline. She is oriented to person and place, though not situation or date. She also presents to ED today with cast to left arm from previous fall about 2 weeks ago. Additionally, son reports new onset of pressure ulcers on her heels and coccyx. He states that she usually lays in bed or sits in recliner at home, but otherwise is very immobile. Patient is hard of hearing and with confusion, is unable to answer any ROS questions. Upon further workup done in ED tonight, she was found to have UTI.        Past Medical History:   Diagnosis Date   • Dementia    • Wrist fracture      Past Surgical History:   Procedure Laterality Date   • TOTAL HIP ARTHROPLASTY       History reviewed. No pertinent family history.  Social History     Tobacco Use   • Smoking status: Never Smoker   Substance Use Topics   • Alcohol use: No     Frequency: Never   • Drug use: Not on file     No medications prior to admission.     Allergies:  No Known Allergies    Review of Systems   Unable to perform ROS: Mental status change        Objective    Vital Signs  Temp:  [96.3 °F (35.7 °C)-97.6 °F (36.4 °C)] 96.3 °F (35.7 °C)  Heart Rate:  [65-85] 69  Resp:  [18] 18  BP: (123-150)/(64-81) 147/81  SpO2:  [97 %-100 %] 97 %  on  Flow (L/min):  [1-2] 1;   Device (Oxygen Therapy): nasal cannula  Body mass index is 15.62 kg/m².    Physical Exam   Constitutional: She appears cachectic. She appears ill. No  distress.   HENT:   Head: Normocephalic and atraumatic.   Mouth/Throat: Mucous membranes are dry.   Eyes: EOM are normal.   Neck: Normal range of motion. Neck supple.   Cardiovascular: Normal rate, regular rhythm and intact distal pulses.   Pulmonary/Chest: Effort normal. No respiratory distress. She has decreased breath sounds in the right lower field and the left lower field.   Abdominal: Soft. Bowel sounds are normal. She exhibits no distension. There is no tenderness.   Sunken abdomen   Musculoskeletal: Normal range of motion. She exhibits no edema or tenderness.   Left arm in cast from previous fall   Neurological: She is alert. She is disoriented.   Skin: Skin is warm and dry. Bruising noted. She is not diaphoretic.   Skin dry and flaky, unstageable pressure ulcers noted to coccyx and bilateral heels   Psychiatric: Cognition and memory are impaired.   Nursing note and vitals reviewed.      Results Review:   I reviewed the patient's new clinical results including all labs and xrays.    Lab Results (last 24 hours)     Procedure Component Value Units Date/Time    CBC & Differential [[id number]] Collected:  12/02/18 1929    Specimen:  Blood Updated:  12/02/18 1948    Narrative:       The following orders were created for panel order CBC & Differential.  Procedure                               Abnormality         Status                     ---------                               -----------         ------                     CBC Auto Differential[[id number]]        Abnormal            Final result                 Please view results for these tests on the individual orders.    Blood Culture - Blood, Arm, Left [[id number]] Collected:  12/02/18 1929    Specimen:  Blood from Arm, Left Updated:  12/02/18 1940    Lactic Acid, Plasma [[id number]]  (Normal) Collected:  12/02/18 1929    Specimen:  Blood Updated:  12/02/18 2001     Lactate 1.3 mmol/L     CK [[id number]]  (Normal) Collected:  12/02/18 1929    Specimen:  Blood  Updated:  12/02/18 2010     Creatine Kinase 35 U/L     Troponin [[id number]]  (Normal) Collected:  12/02/18 1929    Specimen:  Blood Updated:  12/02/18 2010     Troponin T <0.010 ng/mL     Narrative:       Troponin T Reference Ranges:  Less than 0.03 ng/mL:    Negative for AMI  0.03 to 0.09 ng/mL:      Indeterminant for AMI  Greater than 0.09 ng/mL: Positive for AMI    Comprehensive Metabolic Panel [[id number]]  (Abnormal) Collected:  12/02/18 1929    Specimen:  Blood Updated:  12/02/18 2010     Glucose 103 mg/dL      BUN 31 mg/dL      Creatinine 0.78 mg/dL      Sodium 134 mmol/L      Potassium 3.8 mmol/L      Chloride 100 mmol/L      CO2 24.2 mmol/L      Calcium 9.0 mg/dL      Total Protein 6.7 g/dL      Albumin 3.50 g/dL      ALT (SGPT) 9 U/L      AST (SGOT) 21 U/L      Alkaline Phosphatase 152 U/L      Total Bilirubin 0.6 mg/dL      eGFR Non African Amer 71 mL/min/1.73      Globulin 3.2 gm/dL      A/G Ratio 1.1 g/dL      BUN/Creatinine Ratio 39.7     Anion Gap 9.8 mmol/L     Narrative:       The MDRD GFR formula is only valid for adults with stable renal function between ages 18 and 70.    Protime-INR [[id number]]  (Normal) Collected:  12/02/18 1929    Specimen:  Blood from Arm, Right Updated:  12/02/18 1958     Protime 13.7 Seconds      INR 1.07    CBC Auto Differential [[id number]]  (Abnormal) Collected:  12/02/18 1929    Specimen:  Blood Updated:  12/02/18 1948     WBC 7.21 10*3/mm3      RBC 3.96 10*6/mm3      Hemoglobin 12.0 g/dL      Hematocrit 37.9 %      MCV 95.7 fL      MCH 30.3 pg      MCHC 31.7 g/dL      RDW 13.5 %      RDW-SD 47.2 fl      MPV 8.7 fL      Platelets 300 10*3/mm3      Neutrophil % 76.9 %      Lymphocyte % 13.7 %      Monocyte % 8.6 %      Eosinophil % 0.3 %      Basophil % 0.1 %      Immature Grans % 0.4 %      Neutrophils, Absolute 5.54 10*3/mm3      Lymphocytes, Absolute 0.99 10*3/mm3      Monocytes, Absolute 0.62 10*3/mm3      Eosinophils, Absolute 0.02 10*3/mm3      Basophils, Absolute  0.01 10*3/mm3      Immature Grans, Absolute 0.03 10*3/mm3     Urinalysis With Culture If Indicated - Urine, Catheter In/Out [[id number]]  (Abnormal) Collected:  12/02/18 1954    Specimen:  Urine, Catheter In/Out Updated:  12/02/18 2008     Color, UA Yellow     Appearance, UA Cloudy     pH, UA 6.0     Specific Gravity, UA 1.020     Glucose, UA Negative     Ketones, UA Negative     Bilirubin, UA Negative     Blood, UA Negative     Protein, UA Trace     Leuk Esterase, UA Moderate (2+)     Nitrite, UA Negative     Urobilinogen, UA 1.0 E.U./dL    Urinalysis, Microscopic Only - Urine, Catheter In/Out [[id number]]  (Abnormal) Collected:  12/02/18 1954    Specimen:  Urine, Catheter In/Out Updated:  12/02/18 2008     RBC, UA 0-2 /HPF      WBC, UA 31-50 /HPF      Bacteria, UA 4+ /HPF      Squamous Epithelial Cells, UA 0-2 /HPF      Hyaline Casts, UA 0-2 /LPF      Methodology Automated Microscopy    Urine Culture - Urine, Urine, Catheter In/Out [[id number]] Collected:  12/02/18 1954    Specimen:  Urine, Catheter In/Out Updated:  12/02/18 2008    Blood Culture - Blood, Arm, Left [[id number]] Collected:  12/02/18 2101    Specimen:  Blood from Arm, Left Updated:  12/02/18 2111          CT Head Without Contrast   Final Result   No acute intracranial process.       Radiation dose reduction techniques were utilized, including automated   exposure control and exposure modulation based on body size.       This report was finalized on 12/2/2018 9:15 PM by Dr. Shy Fermin M.D.          XR Chest 2 View   Final Result   Blunting of the right costophrenic angle may reflect scarring or trace   effusion. No definite acute infiltrates are seen, although the patient   does have background emphysematous changes.       This report was finalized on 12/2/2018 8:39 PM by Dr. Shy Fermin M.D.          XR Foot 3+ View Left   Final Result   Soft tissue defect seen overlying the posterior aspect of the calcaneus,   without underlying  cortical abnormality to suggest osteomyelitis.       This report was finalized on 12/2/2018 8:37 PM by Dr. Shy Fermin M.D.            Assessment/Plan      Active Hospital Problems    Diagnosis Date Noted   • **Acute UTI [N39.0] 12/02/2018   • Pressure ulcer [L89.90] 12/03/2018   • Hyponatremia [E87.1] 12/03/2018   • Dementia [F03.90] 12/02/2018      Resolved Hospital Problems   No resolved problems to display.     UTI  -likely cause of acute confusion  -urine culture pending  -continue IV Rocephin daily  -IVF overnight  -consult PT/OT with complaints of previous falls, weakness    Pressure Ulcers  -unstageable pressure ulcers to coccyx and bilateral heels  -will have wound evaluate and give recs  -nutrition consult as well due to poor PO intake and wounds    VTE Ppx  -SCDs    CODE status  -full    I discussed the patients findings and my recommendations with patient and family.    ELISEO Nix  12/02/18  11:50 PM    Addendum: I have reviewed the history and plan as obtained by ELISEO Hernandez. I have personally examined the patient. My exam confirms her physical findings and I agree with the plan as listed above, with the addition to the following:      Dolores Hayes MD  [facility]

## 2018-12-03 NOTE — CONSULTS
"Adult Nutrition  Assessment/PES    Patient Name:  Sarah Jeffrey  YOB: 1935  MRN: [id number]  Admit Date:  12/2/2018    Assessment Date:  12/3/2018    Comments:  Nutrition assessment and MSA completed. Son reports pt with decrease food and liquids at home. Pressure injuries noted. Po encouraged, and will offer supplements/snacks.    Reason for Assessment     Row Name 12/03/18 0834          Reason for Assessment    Reason For Assessment  identified at risk by screening criteria;physician consult     Diagnosis  -- UTI, dementia, hyponatremia, fx         Nutrition/Diet History     Row Name 12/03/18 0835          Nutrition/Diet History    Typical Food/Fluid Intake  s/p fall and fx wrist 2 weeks ago, has been with decrease appetite, decrease fluids, and less mobile at home since then     Food Preferences  coffee         Anthropometrics     Row Name 12/03/18 0836 12/03/18 0051       Anthropometrics    Height  152.4 cm (60\")  152.4 cm (60\")    Weight  36.3 kg (80 lb 0 oz)  36.3 kg (80 lb)       Ideal Body Weight (IBW)    Ideal Body Weight (IBW) (kg)  45.86  45.86    % Ideal Body Weight  79.13  79.13       Body Mass Index (BMI)    BMI (kg/m2)  15.66  15.66    BMI Assessment  BMI less than 16: protein-energy malnutrition grade III  --       IBW Adjustment, Para/Tetraplegia    5% Adjustment, Para (IBW)  43.57  43.57    10% Adjustment, Para (IBW)  41.27  41.27    10% Adjustment, Tetra (IBW)  41.27  41.27    15% Adjustment, Tetra (IBW)  38.98  38.98        Labs/Tests/Procedures/Meds     Row Name 12/03/18 0837          Labs/Procedures/Meds    Lab Results Reviewed  reviewed     Lab Results Comments  na, bun, cr,        Diagnostic Tests/Procedures    Diagnostic Test/Procedure Reviewed  reviewed        Medications    Pertinent Medications Reviewed  reviewed     Pertinent Medications Comments  rocephin,         Physical Findings     Row Name 12/03/18 0830          Physical Findings    Overall Physical Appearance  loss of " "subcutaneous fat;loss of muscle mass;underweight     Skin  pressure injury coccyx, bilateral heels         Estimated/Assessed Needs     Row Name 12/03/18 0839 12/03/18 0836       Calculation Measurements    Weight Used For Calculations  36.3 kg (80 lb 0.4 oz)  --    Height  --  152.4 cm (60\")       Estimated/Assessed Needs    Additional Documentation  -- 1450kcals, 55 gms pro, 1090cc fluid  --       KCAL/KG    14 Kcal/Kg (kcal)  508.2  --    15 Kcal/Kg (kcal)  544.5  --    18 Kcal/Kg (kcal)  653.4  --    20 Kcal/Kg (kcal)  726  --    25 Kcal/Kg (kcal)  907.5  --    30 Kcal/Kg (kcal)  1089  --    35 Kcal/Kg (kcal)  1270.5  --    40 Kcal/Kg (kcal)  1452  --    45 Kcal/Kg (kcal)  1633.5  --    50 Kcal/Kg (kcal)  1815  --       Van Etten-St. Jeor Equation    RMR (Van Etten-St. Jeor Equation)  739.5  --       Fluid Requirements    Kayli-Vernell Method (over 20 kg)  2226  --    Row Name 12/03/18 0051          Calculation Measurements    Height  152.4 cm (60\")         Nutrition Prescription Ordered     Row Name 12/03/18 0840          Nutrition Prescription PO    Current PO Diet  Regular         Evaluation of Received Nutrient/Fluid Intake     Row Name 12/03/18 0839 12/03/18 0836       Calculation Measurements    Weight Used For Calculations  36.3 kg (80 lb 0.4 oz)  --    Height  --  152.4 cm (60\")    Row Name 12/03/18 0051          Calculation Measurements    Height  152.4 cm (60\")         Evaluation of Prescribed Nutrient/Fluid Intake     Row Name 12/03/18 0839 12/03/18 0836       Calculation Measurements    Weight Used For Calculations  36.3 kg (80 lb 0.4 oz)  --    Height  --  152.4 cm (60\")    Row Name 12/03/18 0051          Calculation Measurements    Height  152.4 cm (60\")         Malnutrition Severity Assessment     Row Name 12/03/18 0842          Malnutrition Severity Assessment    Malnutrition Type  Acute Illness/Injury Malnutrition        Physical Signs of Malnutrition (Acute)    Muscle Wasting  Moderate temple and " clavicle region     Fat Loss  Moderate bicep/tricep        Weight Status (Acute)    BMI  Severe (<16)     %IBW  Mod (<80%)        Energy Intake Status (Acute)    Energy Intake  Severe (< or equal to 50% / > or equal to 5d)        Criteria Met (Must meet criteria for severity in at least 2 of these categories: M Wasting, Fat Loss, Fluid, Secondary Signs, Wt. Status, Intake)    Patient meets criteria for   Severe malnutrition           Problem/Interventions:  Problem 1     Row Name 12/03/18 0841          Nutrition Diagnoses Problem 1    Problem 1  Increased Nutrient Needs     Etiology (related to)  Medical Diagnosis     Infectious Disease  UTI     Signs/Symptoms (evidenced by)  Report of Mnimal PO Intake;BMI                 Intervention Goal     Row Name 12/03/18 0842          Intervention Goal    General  Maintain nutrition     PO  Increase intake;PO intake (%)     PO Intake %  100 %     Weight  Appropriate weight gain         Nutrition Intervention     Row Name 12/03/18 0842          Nutrition Intervention    RD/Tech Action  Follow Tx progress;Care plan reviewd;Encourage intake;Interview for preference;Supplement provided           Education/Evaluation     Row Name 12/03/18 0842          Education    Education  Will Instruct as appropriate        Monitor/Evaluation    Monitor  Per protocol           Electronically signed by:  Virgen Segura RD  12/03/18 8:44 AM
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
## 2018-12-03 NOTE — NURSING NOTE
12/03/18 0950   Wound 12/02/18 2045 Other (See comments) midline coccyx pressure injury   Date first assessed/Time first assessed: 12/02/18 2045   Present On Admission : yes  Side: Other (See comments)  Orientation: midline  Location: coccyx  Type: pressure injury  Stage, Pressure Injury: unstageable   Dressing Appearance moist drainage   Closure None   Base clean;black eschar;moist  (edges pink; eschar soft)   Black (%), Wound Tissue Color 95   Red (%), Wound Tissue Color 5   Periwound intact;dry;redness   Periwound Temperature warm   Edges irregular   Wound Length (cm) 8 cm   Wound Width (cm) 2.5 cm   Wound Depth (cm) 0 cm   Drainage Characteristics/Odor serous   Drainage Amount small   Care, Wound cleansed with;sterile normal saline   Dressing Care, Wound (silicone border drsg )   Wound 12/02/18 2046 Right heel pressure injury   Date first assessed/Time first assessed: 12/02/18 2046   Present On Admission : yes  Side: Right  Location: heel  Type: pressure injury  Stage, Pressure Injury: unstageable   Dressing Appearance no drainage   Closure None   Base black eschar   Periwound intact;dry;redness   Periwound Temperature warm   Edges irregular   Wound Length (cm) 2 cm   Wound Width (cm) 2.5 cm   Wound Depth (cm) 0 cm   Drainage Characteristics/Odor serous   Drainage Amount scant   Care, Wound cleansed with;sterile normal saline   Dressing Care, Wound (silicone border drsg)   Wound 12/03/18 0139 Left heel pressure injury   Date first assessed/Time first assessed: 12/03/18 0139   Present On Admission : yes;picture taken  Side: Left  Location: heel  Type: pressure injury  Stage, Pressure Injury: unstageable   Dressing Appearance moist drainage   Closure None   Base moist;pink;slough   Black (%), Wound Tissue Color 10   Red (%), Wound Tissue Color 90   Periwound intact;dry   Periwound Temperature warm   Edges open   Wound Length (cm) 2.2 cm   Wound Width (cm) 2.3 cm   Wound Depth (cm) 0 cm   Drainage  Characteristics/Odor serous   Drainage Amount scant   Care, Wound cleansed with;sterile normal saline   Dressing Care, Wound (silicone border drsg)   82 y/o very thin female seen for multiple pressure injuries, present on admission.  Low air loss mattress ordered.  Order placed in Epic to begin santyl and 1/8 st dakin's drsgs to all wounds; discussed with primary RN, Ann.  To be seen by nutritionist due to poor po intake; needs extra protein for wound healing.  Pt awake, answered simple questions, stated she was in the hospital.  No family at bedside

## 2018-12-03 NOTE — CONSULTS
Malnutrition Severity Assessment    Patient Name:  Sarah Jeffrey  YOB: 1935  MRN: [id number]  Admit Date:  12/2/2018    Patient meets criteria for : Severe malnutrition    Comments:  Poor po intake at home    Malnutrition Type: Acute Illness/Injury Malnutrition     Malnutrition Type (last 8 hours)      Malnutrition Severity Assessment     Row Name 12/03/18 0842       Malnutrition Severity Assessment    Malnutrition Type  Acute Illness/Injury Malnutrition    Row Name 12/03/18 0842       Physical Signs of Malnutrition (Acute)    Muscle Wasting  Moderate temple and clavicle region    Fat Loss  Moderate bicep/tricep    Row Name 12/03/18 0842       Weight Status (Acute)    BMI  Severe (<16)    %IBW  Mod (<80%)    Row Name 12/03/18 0842       Energy Intake Status (Acute)    Energy Intake  Severe (< or equal to 50% / > or equal to 5d)    Row Name 12/03/18 0842       Criteria Met (Must meet criteria for severity in at least 2 of these categories: M Wasting, Fat Loss, Fluid, Secondary Signs, Wt. Status, Intake)    Patient meets criteria for   Severe malnutrition          Electronically signed by:  Virgen Segura RD  12/03/18 8:44 AM

## 2018-12-03 NOTE — DISCHARGE PLACEMENT REQUEST
"Sarah Campbell (83 y.o. Female)     Date of Birth Social Security Number Address Home Phone MRN    1935  [address] [phone] [id number]    Druze Marital Status          Holiness        Admission Date Admission Type Admitting Provider Attending Provider Department, Room/Bed    12/2/18 Emergency Dolores Hayes MD McCracken, Robert Russell, MD [address], [address]    Discharge Date Discharge Disposition Discharge Destination                       Attending Provider:  Lele Pak MD    Allergies:  No Known Allergies    Isolation:  None   Infection:  None   Code Status:  CPR    Ht:  152.4 cm (60\")   Wt:  36.3 kg (80 lb 0 oz)    Admission Cmt:  None   Principal Problem:  Acute UTI [N39.0]                 Active Insurance as of 12/2/2018     Primary Coverage     Payor Plan Insurance Group Employer/Plan Group    MEDICARE MEDICARE A & B      Payor Plan Address Payor Plan Phone Number Payor Plan Fax Number Effective Dates    [address] [phone]  12/1/2001 - None Entered    [place] [address]       Subscriber Name Subscriber Birth Date Member ID       SARAH CAMPBELL 1935 [id number]                 Emergency Contacts      (Rel.) Home Phone Work Phone Mobile Phone    VÍCTOR CAMPBELL (Son) [phone] -- --              "

## 2018-12-03 NOTE — PROGRESS NOTES
" LOS: 1 day     Name: Sarah Jeffrey  Age: 83 y.o.  Sex: female  :  1935  MRN: [id number]         Primary Care Physician: System, Provider Not In    Subjective   Subjective  No new complaints or overnight events.    Objective   Vital Signs  Temp:  [96.3 °F (35.7 °C)-98.3 °F (36.8 °C)] 98.3 °F (36.8 °C)  Heart Rate:  [65-85] 80  Resp:  [16-18] 16  BP: (123-150)/(64-81) 126/65  Body mass index is 15.62 kg/m².    Objective:  General Appearance:  Comfortable and in no acute distress (Elderly, exceedingly frail and cachectic appearing).    Vital signs: (most recent): Blood pressure 126/65, pulse 80, temperature 98.3 °F (36.8 °C), temperature source Oral, resp. rate 16, height 152.4 cm (60\"), weight 36.3 kg (80 lb 0 oz), SpO2 100 %.    Lungs:  Normal effort and normal respiratory rate.  There are decreased breath sounds.    Heart: Normal rate.  Regular rhythm.    Abdomen: Abdomen is soft.  Bowel sounds are normal.   There is no abdominal tenderness.     Extremities: There is no dependent edema or local swelling.    Neurological: Patient is alert.  (Pleasantly confused).    Skin:  Warm and dry.              Results Review:       I reviewed the patient's new clinical results.    Results from last 7 days   Lab Units  18   WBC 10*3/mm3  7.09  7.21   HEMOGLOBIN g/dL  11.9  12.0   PLATELETS 10*3/mm3  256  300     Results from last 7 days   Lab Units  18   0558  18   SODIUM mmol/L  137  134*   POTASSIUM mmol/L  3.7  3.8   CHLORIDE mmol/L  104  100   CO2 mmol/L  19.9*  24.2   BUN mg/dL  25*  31*   CREATININE mg/dL  0.50*  0.78   CALCIUM mg/dL  8.5*  9.0   GLUCOSE mg/dL  87  103*     Results from last 7 days   Lab Units  18   INR   1.07     Scheduled Meds:     ceftriaxone 1 g Intravenous Q24H   collagenase  Topical Q12H   sodium chloride 3 mL Intravenous Q12H   Sodium Hypochlorite 0.0625 % (Dakin's 1/8th Strength) topical solution 946 mL Irrigation Q12H     PRN " Meds:   •  acetaminophen  •  ondansetron **OR** ondansetron ODT **OR** ondansetron  •  sodium chloride  •  [COMPLETED] Insert peripheral IV **AND** sodium chloride  Continuous Infusions:    sodium chloride 100 mL/hr Last Rate: 100 mL/hr (12/03/18 0130)       Assessment/Plan   Active Hospital Problems    Diagnosis Date Noted   • **Acute UTI [N39.0] 12/02/2018   • Pressure ulcer [L89.90] 12/03/2018   • Hyponatremia [E87.1] 12/03/2018   • Dementia [F03.90] 12/02/2018      Resolved Hospital Problems   No resolved problems to display.       Assessment & Plan    - Continue Rocephin and await urine culture results  - Local wound care for decubitus pressure ulcer.  Discussed with wound care nurse.  - Hyponatremia resolved.  Continue gentle IV fluids today.  - Dementia is likely at baseline  - CCP to assist with discharge planning    Lele Pak MD  [facility]  12/03/18  12:13 PM

## 2018-12-03 NOTE — PLAN OF CARE
Problem: Nutrition, Imbalanced: Inadequate Oral Intake (Adult)  Intervention: Promote/Optimize Nutrition   12/03/18 0851   Nutrition Interventions   Oral Nutrition Promotion nutritional therapy counseling provided;calorie dense liquids provided

## 2018-12-03 NOTE — PLAN OF CARE
Problem: Patient Care Overview  Goal: Plan of Care Review  Outcome: Ongoing (interventions implemented as appropriate)   12/03/18 1002   Coping/Psychosocial   Plan of Care Reviewed With patient   OTHER   Outcome Summary Pt requires encouragement to get up to EOB with max A. Pt frail, not initiating tasks. Pt may benefit from skilled OT to increase safety and independence.

## 2018-12-03 NOTE — THERAPY EVALUATION
"Acute Care - Physical Therapy Initial Evaluation  [facility]     Patient Name: Sarah Jeffrey  : 1935  MRN: [id number]  Today's Date: 12/3/2018   Onset of Illness/Injury or Date of Surgery: 18  Date of Referral to PT: 18  Referring Physician: Mellisa      Admit Date: 2018    Visit Dx:     ICD-10-CM ICD-9-CM   1. Acute UTI N39.0 599.0   2. Failure to thrive in adult R62.7 783.7   3. Pressure injury of skin of sacral region, unspecified injury stage L89.159 707.03     707.20   4. Pressure injury of dorsum of left foot, stage 1 L89.891 707.09     707.21   5. Impaired functional mobility and activity tolerance Z74.09 V49.89     Patient Active Problem List   Diagnosis   • Acute UTI   • Dementia   • Pressure ulcer   • Hyponatremia     Past Medical History:   Diagnosis Date   • Dementia    • Wrist fracture      Past Surgical History:   Procedure Laterality Date   • TOTAL HIP ARTHROPLASTY          PT ASSESSMENT (last 12 hours)      Physical Therapy Evaluation     Row Name 18 0904          PT Evaluation Time/Intention    Subjective Information  complains of;weakness  -PC     Document Type  evaluation  -PC     Mode of Treatment  physical therapy  -PC     Patient Effort  poor  -PC     Comment  pt needed alot of encouragement to participate, kept saying, \"I don't want to\"   -PC     Row Name 18 0904          General Information    Patient Profile Reviewed?  yes  -PC     Onset of Illness/Injury or Date of Surgery  18  -PC     Referring Physician  Mellisa  -PC     Patient Observations  lethargic;poorly cooperative  -PC     Patient/Family Observations  pt is a thin elderly female with cast on L arm, supine in bed  -PC     Prior Level of Function  -- per chart, son lives with pt, requiring more assist recently  -PC     Equipment Currently Used at Home  -- unsure, pt stated used walker at times  -PC     Pertinent History of Current Functional Problem  pt adm with 2 week hx of dec " appetite, not wanting to get out of bed  -PC     Row Name 12/03/18 0904          Relationship/Environment    Primary Source of Support/Comfort  child(howard)  -     Row Name 12/03/18 0904          Cognitive Assessment/Intervention- PT/OT    Orientation Status (Cognition)  oriented to;person  -PC     Follows Commands (Cognition)  follows one step commands;50-74% accuracy;repetition of directions required;verbal cues/prompting required  -     Safety Deficit (Cognitive)  moderate deficit;insight into deficits/self awareness  -     Row Name 12/03/18 0904          Safety Issues, Functional Mobility    Safety Issues Affecting Function (Mobility)  insight into deficits/self awareness  -     Impairments Affecting Function (Mobility)  strength;endurance/activity tolerance  -     Row Name 12/03/18 0904          Bed Mobility Assessment/Treatment    Bed Mobility Assessment/Treatment  supine-sit;sit-supine  -     Supine-Sit [place] (Bed Mobility)  moderate assist (50% patient effort);2 person assist  -PC     Sit-Supine [place] (Bed Mobility)  moderate assist (50% patient effort);2 person assist;maximum assist (25% patient effort)  -     Comment (Bed Mobility)  inc level of assist needed due to poor participation of pat  -PC     Row Name 12/03/18 0904          Transfer Assessment/Treatment    Transfer Assessment/Treatment  sit-stand transfer;stand-sit transfer  -     Sit-Stand [place] (Transfers)  moderate assist (50% patient effort);2 person assist  -     Stand-Sit [place] (Transfers)  moderate assist (50% patient effort);2 person assist  -     Row Name 12/03/18 0904          Sit-Stand Transfer    Assistive Device (Sit-Stand Transfers)  -- HHA   -PC     Row Name 12/03/18 0904          Stand-Sit Transfer    Assistive Device (Stand-Sit Transfers)  -- HHA   -PC     Row Name 12/03/18 0904          Gait/Stairs Assessment/Training    Comment (Gait/Stairs)  pt not able/unwilling to attempt steps   -PC     Row Name 12/03/18 0904          General ROM    GENERAL ROM COMMENTS  WFL x L wrist  -PC     Row Name 12/03/18 0904          MMT (Manual Muscle Testing)    General MMT Comments  BUE 3-/5, B LE 3-/5  -PC     Row Name 12/03/18 0904          Motor Assessment/Intervention    Additional Documentation  Balance (Group)  -     Row Name 12/03/18 0904          Balance    Balance  static sitting balance;static standing balance  -     Row Name 12/03/18 0904          Static Sitting Balance    Level of [place] (Unsupported Sitting, Static Balance)  contact guard assist  -PC     Sitting Position (Unsupported Sitting, Static Balance)  sitting on edge of bed  -PC     Time Able to Maintain Position (Unsupported Sitting, Static Balance)  3 to 4 minutes  -     Row Name 12/03/18 0904          Static Standing Balance    Level of [place] (Supported Standing, Static Balance)  moderate assist, 50 to 74% patient effort  -PC     Time Able to Maintain Position (Supported Standing, Static Balance)  15 to 30 seconds  -PC     Assistive Device Utilized (Supported Standing, Static Balance)  -- HHA X 2  -PC     Row Name 12/03/18 0904          Pain Assessment    Additional Documentation  Pain Scale: Numbers Pre/Post-Treatment (Group);Pain Scale: Word Pre/Post-Treatment (Group)  -     Row Name 12/03/18 0904          Pain Scale: Numbers Pre/Post-Treatment    Pain Scale: Numbers, Pretreatment  0/10 - no pain  -     Row Name             Wound 12/02/18 2045 Other (See comments) midline coccyx pressure injury    Wound - Properties Group Date first assessed: 12/02/18  - Time first assessed: 2045  -GC Present On Admission : yes  -GC Side: Other (See comments)  -GC Orientation: midline  -GC Location: coccyx  -GC Type: pressure injury  -GC Stage, Pressure Injury: unstageable  -GC    Row Name             Wound 12/02/18 2046 Right heel pressure injury    Wound - Properties Group Date first assessed: 12/02/18  - Time first assessed:  2046  -GC Present On Admission : yes  -GC Side: Right  -GC Location: heel  -GC Type: pressure injury  -GC Stage, Pressure Injury: unstageable  -GC    Row Name             Wound 12/03/18 0139 Left heel pressure injury    Wound - Properties Group Date first assessed: 12/03/18  -EN Time first assessed: 0139  -EN Present On Admission : yes;picture taken  -EN Side: Left  -EN Location: heel  -EN Type: pressure injury  -EN Stage, Pressure Injury: unstageable  -EN    Row Name 12/03/18 0904          Plan of Care Review    Plan of Care Reviewed With  patient  -PC     Row Name 12/03/18 0904          Physical Therapy Clinical Impression    Date of Referral to PT  12/03/18  -PC     Patient/Family Goals Statement (PT Clinical Impression)  pt asking to go home  -PC     Criteria for Skilled Interventions Met (PT Clinical Impression)  yes;treatment indicated  -PC     Impairments Found (describe specific impairments)  gait, locomotion, and balance;arousal, attention, and cognition;muscle performance  -PC     Rehab Potential (PT Clinical Summary)  good, to achieve stated therapy goals  -PC     Row Name 12/03/18 0904          Physical Therapy Goals    Bed Mobility Goal Selection (PT)  bed mobility, PT goal 1  -PC     Transfer Goal Selection (PT)  transfer, PT goal 1  -PC     Gait Training Goal Selection (PT)  gait training, PT goal 1  -PC     Row Name 12/03/18 0904          Bed Mobility Goal 1 (PT)    Activity/Assistive Device (Bed Mobility Goal 1, PT)  sit to supine;supine to sit  -PC     [place] Level/Cues Needed (Bed Mobility Goal 1, PT)  contact guard assist  -PC     Time Frame (Bed Mobility Goal 1, PT)  1 week  -PC     Row Name 12/03/18 0904          Transfer Goal 1 (PT)    Activity/Assistive Device (Transfer Goal 1, PT)  sit-to-stand/stand-to-sit  -PC     [place] Level/Cues Needed (Transfer Goal 1, PT)  contact guard assist  -PC     Time Frame (Transfer Goal 1, PT)  1 week  -PC     Row Name 12/03/18 0904          Gait  Training Goal 1 (PT)    Activity/Assistive Device (Gait Training Goal 1, PT)  decrease fall risk;gait (walking locomotion);assistive device use  -     [place] Level (Gait Training Goal 1, PT)  moderate assist (50-74% patient effort)  -PC     Distance (Gait Goal 1, PT)  50 ft  -PC     Time Frame (Gait Training Goal 1, PT)  1 week  -PC     Row Name 12/03/18 0904          Positioning and Restraints    Pre-Treatment Position  in bed  -PC     Post Treatment Position  bed  -PC     In Bed  supine;call light within reach;encouraged to call for assist;exit alarm on  -PC     Row Name 12/03/18 0904          Living Environment    Home Accessibility  -- pt vague historian  -PC       User Key  (r) = Recorded By, (t) = Taken By, (c) = Cosigned By    Initials Name Provider Type    Ana Blackwood, RN Registered Nurse    PC Diana Arias PT Physical Therapist    Nelia Stapleton RN Registered Nurse        Physical Therapy Education     Title: PT OT SLP Therapies (In Progress)     Topic: Physical Therapy (In Progress)     Point: Mobility training (In Progress)     Learning Progress Summary           Patient Acceptance, E,D, NR by PC at 12/3/2018  9:21 AM                   Point: Home exercise program (In Progress)     Learning Progress Summary           Patient Acceptance, E,D, NR by PC at 12/3/2018  9:21 AM                   Point: Body mechanics (In Progress)     Learning Progress Summary           Patient Acceptance, E,D, NR by PC at 12/3/2018  9:21 AM                   Point: Precautions (In Progress)     Learning Progress Summary           Patient Acceptance, E,D, NR by PC at 12/3/2018  9:21 AM                               User Key     Initials Effective Dates Name Provider Type Discipline     04/03/18 -  Diana Arias PT Physical Therapist PT              PT Recommendation and Plan  Anticipated Discharge Disposition (PT): (depends on progress and level of assist available at  home)  Planned Therapy Interventions (PT Eval): gait training, bed mobility training, home exercise program, strengthening, transfer training  Therapy Frequency (PT Clinical Impression): daily  Outcome Summary/Treatment Plan (PT)  Anticipated Discharge Disposition (PT): (depends on progress and level of assist available at home)  Plan of Care Reviewed With: patient  Outcome Summary: pt presents with weakness and impaired functional mobility, she will benefit from PT to address, pt needed max amount of encouragement to participate, required assist of 2 to sit edge of bed and stand at bedside , will follow   Outcome Measures     Row Name 12/03/18 0900             How much help from another person do you currently need...    Turning from your back to your side while in flat bed without using bedrails?  2  -PC      Moving from lying on back to sitting on the side of a flat bed without bedrails?  2  -PC      Moving to and from a bed to a chair (including a wheelchair)?  2  -PC      Standing up from a chair using your arms (e.g., wheelchair, bedside chair)?  2  -PC      Climbing 3-5 steps with a railing?  1  -PC      To walk in hospital room?  1  -PC      AM-PAC 6 Clicks Score  10  -PC         Functional Assessment    Outcome Measure Options  AM-PAC 6 Clicks Basic Mobility (PT)  -PC        User Key  (r) = Recorded By, (t) = Taken By, (c) = Cosigned By    Initials Name Provider Type    PC Diana Arias, PT Physical Therapist         Time Calculation:   PT Charges     Row Name 12/03/18 0925             Time Calculation    Start Time  0837  -PC      Stop Time  0855  -PC      Time Calculation (min)  18 min  -PC      PT Received On  12/03/18  -PC      PT - Next Appointment  12/04/18  -PC      PT Goal Re-Cert Due Date  12/10/18  -PC        User Key  (r) = Recorded By, (t) = Taken By, (c) = Cosigned By    Initials Name Provider Type    Diana Colvin, PT Physical Therapist        Therapy Suggested Charges     Code    Minutes Charges    None           Therapy Charges for Today     Code Description Service Date Service Provider Modifiers Qty    54776200425 HC PT EVAL MOD COMPLEXITY 2 12/3/2018 Diana Arias, PT GP 1    41001111464 HC PT THER PROC EA 15 MIN 12/3/2018 Diana Arias, PT GP 1          PT G-Codes  Outcome Measure Options: AM-PAC 6 Clicks Basic Mobility (PT)  AM-PAC 6 Clicks Score: 10      Diana Arias, PT  12/3/2018

## 2018-12-03 NOTE — PLAN OF CARE
Problem: Patient Care Overview  Goal: Plan of Care Review  Outcome: Ongoing (interventions implemented as appropriate)   12/03/18 5127   Coping/Psychosocial   Plan of Care Reviewed With patient   OTHER   Outcome Summary pt presents with weakness and impaired functional mobility, she will benefit from PT to address, pt needed max amount of encouragement to participate, required assist of 2 to sit edge of bed and stand at bedside , will follow

## 2018-12-03 NOTE — ED NOTES
"Son at bedside reports he lives with Pt. States he noticed her more confused than normal starting x1 week. States decreased appetite began \"acouple of weeks ago\".  While performing straight cath Pt received perineal cleaning by this RN and MARIA ISABEL Arambula while turned to her L side a 6 cm x 4 cm unstageable pressure ulcer noted at coccyx, and a 2 cm x 1 cm noted at R heel.  Clean mediscus pad placed under Pt at this time  Son was unable to state when pressure ulcers occurred. MD Nupur notified     Ana Elder RN  12/02/18 2049    "

## 2018-12-04 PROBLEM — B96.20 E. COLI UTI: Status: ACTIVE | Noted: 2018-12-02

## 2018-12-04 LAB — BACTERIA SPEC AEROBE CULT: ABNORMAL

## 2018-12-04 PROCEDURE — 97110 THERAPEUTIC EXERCISES: CPT

## 2018-12-04 PROCEDURE — 25010000002 CEFTRIAXONE PER 250 MG: Performed by: INTERNAL MEDICINE

## 2018-12-04 RX ADMIN — SODIUM CHLORIDE, PRESERVATIVE FREE 3 ML: 5 INJECTION INTRAVENOUS at 23:25

## 2018-12-04 RX ADMIN — SODIUM CHLORIDE, PRESERVATIVE FREE 3 ML: 5 INJECTION INTRAVENOUS at 09:41

## 2018-12-04 RX ADMIN — COLLAGENASE SANTYL: 250 OINTMENT TOPICAL at 09:53

## 2018-12-04 RX ADMIN — DAKIN'S SOLUTION 0.125% (QUARTER STRENGTH) 946 ML: 0.12 SOLUTION at 09:53

## 2018-12-04 RX ADMIN — DAKIN'S SOLUTION 0.125% (QUARTER STRENGTH) 946 ML: 0.12 SOLUTION at 22:00

## 2018-12-04 RX ADMIN — COLLAGENASE SANTYL: 250 OINTMENT TOPICAL at 22:00

## 2018-12-04 RX ADMIN — CEFTRIAXONE SODIUM 1 G: 1 INJECTION, SOLUTION INTRAVENOUS at 23:18

## 2018-12-04 NOTE — PLAN OF CARE
Problem: Patient Care Overview  Goal: Plan of Care Review  Outcome: Ongoing (interventions implemented as appropriate)   12/04/18 1002   Coping/Psychosocial   Plan of Care Reviewed With patient   Plan of Care Review   Progress improving   OTHER   Outcome Summary Pt increasing with activity tolerance and bed mobility to transfer to standing with Min A HHA

## 2018-12-04 NOTE — PLAN OF CARE
Problem: Patient Care Overview  Goal: Plan of Care Review  Outcome: Ongoing (interventions implemented as appropriate)   12/04/18 0702   Coping/Psychosocial   Plan of Care Reviewed With patient   Plan of Care Review   Progress improving   OTHER   Outcome Summary Wound care. Q2 turn. Bath completed. No complaints. IV fluids. vss. will continue to monitor.

## 2018-12-04 NOTE — PROGRESS NOTES
Continued Stay Note  [facility]     Patient Name: Sarah Jeffrey  MRN: [id number]  Today's Date: 12/4/2018    Admit Date: 12/2/2018    Discharge Plan     Row Name 12/04/18 1336       Plan    Plan  Referrals to SNF    Plan Comments  Recieved  Glendy/Juliet Quiñones, pt is accepted and bed available. CCP called [facility] ([phone]) and left vm requesting referral outcome. CCP Called [facility] ([phone]) and left vm requesting referral outcome. CCP called [facility] and left message for fox to call ccp back. CCP called [facility] and unable to take medicaid pending. CCP called local APS Office [phone] and left vm for Cindy Gillis to determine who is assigned  for pts case. Requested call back, as no one had called CCP back yesterday. Spoke with Jessy pts son and updated and he would also like referral to [facility] for eval. CCP called [facility] for eval. Spoke with Brenda/Domi and she is planning on touring [facility] tomorrow. Packet in CCP office. sam crystal/ccp        Discharge Codes    No documentation.             Neida Pearson RN

## 2018-12-04 NOTE — PROGRESS NOTES
" LOS: 2 days     Name: Sarah Jeffrey  Age: 83 y.o.  Sex: female  :  1935  MRN: [id number]         Primary Care Physician: System, Provider Not In    Subjective   Subjective  No new complaints or overnight events.  Remains pleasantly confused    Objective   Vital Signs  Temp:  [97.3 °F (36.3 °C)-98.8 °F (37.1 °C)] 98.8 °F (37.1 °C)  Heart Rate:  [72-79] 75  Resp:  [16-18] 18  BP: (111-161)/(62-75) 161/75  Body mass index is 15.62 kg/m².    Objective:  General Appearance:  Comfortable and in no acute distress (Chronically ill-appearing.  Thin, frail, cachectic).    Vital signs: (most recent): Blood pressure 161/75, pulse 75, temperature 98.8 °F (37.1 °C), temperature source Oral, resp. rate 18, height 152.4 cm (60\"), weight 36.3 kg (80 lb 0 oz), SpO2 98 %.    Lungs:  Normal effort and normal respiratory rate.    Heart: Normal rate.  Regular rhythm.    Abdomen: Abdomen is soft.  Bowel sounds are normal.   There is no abdominal tenderness.     Extremities: There is no dependent edema or local swelling.  (Left wrist and hand are in a hard cast)  Neurological: Patient is alert and oriented to person, place and time.    Skin:  Warm and dry.              Results Review:       I reviewed the patient's new clinical results.    Results from last 7 days   Lab Units  18   0558  18   WBC 10*3/mm3  7.09  7.21   HEMOGLOBIN g/dL  11.9  12.0   PLATELETS 10*3/mm3  256  300     Results from last 7 days   Lab Units  18   0558  18   SODIUM mmol/L  137  134*   POTASSIUM mmol/L  3.7  3.8   CHLORIDE mmol/L  104  100   CO2 mmol/L  19.9*  24.2   BUN mg/dL  25*  31*   CREATININE mg/dL  0.50*  0.78   CALCIUM mg/dL  8.5*  9.0   GLUCOSE mg/dL  87  103*     Results from last 7 days   Lab Units  18   INR   1.07       Scheduled Meds:     ceftriaxone 1 g Intravenous Q24H   collagenase  Topical Q12H   sodium chloride 3 mL Intravenous Q12H   Sodium Hypochlorite 0.0625 % (Dakin's  Strength) " topical solution 946 mL Irrigation Q12H     PRN Meds:   •  acetaminophen  •  nitroglycerin  •  ondansetron **OR** ondansetron ODT **OR** ondansetron  •  sodium chloride  •  [COMPLETED] Insert peripheral IV **AND** sodium chloride  Continuous Infusions:    sodium chloride 50 mL/hr Last Rate: 50 mL/hr (12/03/18 1253)       Assessment/Plan   Active Hospital Problems    Diagnosis Date Noted   • **E. coli UTI [N39.0, B96.20] 12/02/2018   • Pressure ulcer [L89.90] 12/03/2018   • Hyponatremia [E87.1] 12/03/2018   • Dementia [F03.90] 12/02/2018      Resolved Hospital Problems   No resolved problems to display.       Assessment & Plan    -  she will complete 3 doses of Rocephin today for Escherichia coli urinary tract infection which is all she will need.  - Local wound care for decubitus pressure ulcer.  She is in offloading heel boots as well.  - Hyponatremia resolved.   Stop IV fluids  - Dementia is likely at baseline  - CCP to assist with discharge planning    Lele Pak MD  [facility]  12/04/18  10:45 AM

## 2018-12-04 NOTE — THERAPY TREATMENT NOTE
Acute Care - Physical Therapy Treatment Note  [facility]     Patient Name: Sarah Jeffrey  : 1935  MRN: [id number]  Today's Date: 2018  Onset of Illness/Injury or Date of Surgery: 18  Date of Referral to PT: 18  Referring Physician: Mellisa    Admit Date: 2018    Visit Dx:    ICD-10-CM ICD-9-CM   1. Acute UTI N39.0 599.0   2. Failure to thrive in adult R62.7 783.7   3. Pressure injury of skin of sacral region, unspecified injury stage L89.159 707.03     707.20   4. Pressure injury of dorsum of left foot, stage 1 L89.891 707.09     707.21   5. Impaired functional mobility and activity tolerance Z74.09 V49.89     Patient Active Problem List   Diagnosis   • Acute UTI   • Dementia   • Pressure ulcer   • Hyponatremia       Therapy Treatment    Rehabilitation Treatment Summary     Row Name 1842             Treatment Time/Intention    Discipline  physical therapy assistant  -CW      Document Type  therapy note (daily note)  -CW      Subjective Information  complains of;weakness;fatigue  -CW      Mode of Treatment  physical therapy  -CW      Therapy Frequency (PT Clinical Impression)  daily  -CW      Patient Effort  adequate  -CW      Existing Precautions/Restrictions  fall  -CW      Recorded by [CW] Sundar Gardner PTA 18 1001      Row Name 1842             Vital Signs    O2 Delivery Pre Treatment  room air  -CW      Recorded by [CW] Sundar Gardner PTA 18 1001      Row Name 1842             Cognitive Assessment/Intervention- PT/OT    Orientation Status (Cognition)  oriented to;person  -CW      Follows Commands (Cognition)  follows one step commands;over 90% accuracy  -CW      Safety Deficit (Cognitive)  moderate deficit;awareness of need for assistance;safety precautions follow-through/compliance  -CW      Recorded by [CW] Sundar Gardner PTA 18 1001      Row Name 1842             Bed Mobility Assessment/Treatment    Bed  Mobility Assessment/Treatment  supine-sit;sit-supine  -CW      Supine-Sit Yates (Bed Mobility)  minimum assist (75% patient effort)  -CW      Sit-Supine Yates (Bed Mobility)  minimum assist (75% patient effort)  -CW      Recorded by [CW] Sundar Gardner, PTA 12/04/18 1001      Row Name 12/04/18 0942             Transfer Assessment/Treatment    Transfer Assessment/Treatment  sit-stand transfer;stand-sit transfer  -CW      Recorded by [CW] Sundar Gardner, PTA 12/04/18 1001      Row Name 12/04/18 0942             Sit-Stand Transfer    Sit-Stand Yates (Transfers)  moderate assist (50% patient effort)  -CW      Assistive Device (Sit-Stand Transfers)  -- HHA  -CW      Recorded by [CW] Sundar Gardner, PTA 12/04/18 1001      Row Name 12/04/18 0942             Stand-Sit Transfer    Stand-Sit Yates (Transfers)  moderate assist (50% patient effort)  -CW      Assistive Device (Stand-Sit Transfers)  -- HHA  -CW      Recorded by [CW] Sundar Gardner, PTA 12/04/18 1001      Row Name 12/04/18 0942             Gait/Stairs Assessment/Training    Yates Level (Gait)  moderate assist (50% patient effort) HHA  -CW      Distance in Feet (Gait)  20  -CW      Pattern (Gait)  step-to  -CW      Deviations/Abnormal Patterns (Gait)  antalgic;ataxic;maria fernanda decreased;gait speed decreased;stride length decreased  -CW      Recorded by [CW] Sundar Gardner, PTA 12/04/18 1001      Row Name 12/04/18 0942             Positioning and Restraints    Pre-Treatment Position  in bed  -CW      Post Treatment Position  bed  -CW      In Bed  notified nsg;supine;call light within reach;encouraged to call for assist;with nsg  -CW      Recorded by [CW] Sundar Gardner, PTA 12/04/18 1001      Row Name                Wound 12/02/18 2045 Other (See comments) midline coccyx pressure injury    Wound - Properties Group Date first assessed: 12/02/18 [GC] Time first assessed: 2045 [GC] Present On Admission :  yes [GC] Side: Other (See comments) [GC] Orientation: midline [GC] Location: coccyx [GC] Type: pressure injury [GC] Stage, Pressure Injury: unstageable [GC] Recorded by:  [GC] Ana Elder RN 12/02/18 2103    Row Name                Wound 12/02/18 2046 Right heel pressure injury    Wound - Properties Group Date first assessed: 12/02/18 [GC] Time first assessed: 2046 [GC] Present On Admission : yes [GC] Side: Right [GC] Location: heel [GC] Type: pressure injury [GC] Stage, Pressure Injury: unstageable [GC] Recorded by:  [GC] Ana Elder RN 12/02/18 2105    Row Name                Wound 12/03/18 0139 Left heel pressure injury    Wound - Properties Group Date first assessed: 12/03/18 [EN] Time first assessed: 0139 [EN] Present On Admission : yes;picture taken [EN] Side: Left [EN] Location: heel [EN] Type: pressure injury [EN] Stage, Pressure Injury: unstageable [EN] Recorded by:  [EN] Nelia Beltran RN 12/03/18 0139    Row Name 12/04/18 0942             Outcome Summary/Treatment Plan (PT)    Anticipated Discharge Disposition (PT)  skilled nursing facility  -      Recorded by [CW] Sundar Gardner, PTA 12/04/18 1001        User Key  (r) = Recorded By, (t) = Taken By, (c) = Cosigned By    Initials Name Effective Dates Discipline     Ana Elder RN 10/30/17 -  Nurse    Nelia Stapleton RN 07/19/17 -  Nurse    Sundar Hudson, PTA 03/07/18 -  PT          Wound 12/02/18 2045 Other (See comments) midline coccyx pressure injury (Active)   Dressing Appearance dry;intact 12/4/2018  9:42 AM   Closure None 12/4/2018 12:11 AM   Base dressing in place, unable to visualize 12/4/2018  9:42 AM   Periwound redness 12/3/2018  8:47 PM   Periwound Temperature warm 12/3/2018  8:47 PM   Drainage Characteristics/Odor serosanguineous;malodorous 12/3/2018  8:47 PM   Drainage Amount small 12/3/2018  8:47 PM   Care, Wound cleansed with;sterile normal  saline;collagenase agent applied 12/3/2018  8:47 PM   Dressing Care, Wound dressing changed 12/3/2018  8:47 PM       Wound 12/02/18 2046 Right heel pressure injury (Active)   Dressing Appearance dry;intact 12/4/2018  9:42 AM   Closure None 12/4/2018 12:11 AM   Base dressing in place, unable to visualize 12/4/2018  9:42 AM   Periwound pale white;blanchable 12/3/2018  8:47 PM   Periwound Temperature warm 12/3/2018  8:47 PM   Periwound Skin Turgor firm 12/3/2018  8:47 PM   Drainage Characteristics/Odor serous 12/3/2018  2:28 PM   Drainage Amount none 12/3/2018  8:47 PM   Care, Wound cleansed with;sterile normal saline;collagenase agent applied 12/3/2018  8:47 PM   Dressing Care, Wound dressing changed 12/3/2018  8:47 PM       Wound 12/03/18 0139 Left heel pressure injury (Active)   Dressing Appearance dry;intact 12/4/2018  9:42 AM   Closure JANET 12/4/2018 12:11 AM   Base dressing in place, unable to visualize 12/4/2018  9:42 AM   Periwound pale white;redness 12/3/2018  8:47 PM   Drainage Characteristics/Odor serous 12/3/2018  2:28 PM   Drainage Amount none 12/3/2018  8:47 PM   Care, Wound cleansed with;sterile normal saline;collagenase agent applied 12/3/2018  8:47 PM   Dressing Care, Wound dressing changed 12/3/2018  8:47 PM           Physical Therapy Education     Title: PT OT SLP Therapies (In Progress)     Topic: Physical Therapy (Done)     Point: Mobility training (Done)     Learning Progress Summary           Patient Acceptance, E,TB, VU,DU by CW at 12/4/2018 10:02 AM    Acceptance, E,D, NR by PC at 12/3/2018  9:21 AM                   Point: Home exercise program (Done)     Learning Progress Summary           Patient Acceptance, E,TB, VU,DU by CW at 12/4/2018 10:02 AM    Acceptance, E,D, NR by PC at 12/3/2018  9:21 AM                   Point: Body mechanics (Done)     Learning Progress Summary           Patient Acceptance, E,TB, VU,DU by CW at 12/4/2018 10:02 AM    Acceptance, E,D, NR by PC at 12/3/2018  9:21 AM                    Point: Precautions (Done)     Learning Progress Summary           Patient Acceptance, E,TB, VU,DU by CW at 12/4/2018 10:02 AM    Acceptance, E,D, NR by PC at 12/3/2018  9:21 AM                               User Key     Initials Effective Dates Name Provider Type Discipline    PC 04/03/18 -  Diana Arias, PT Physical Therapist PT    CW 03/07/18 -  Sundar Gardner, PTA Physical Therapy Assistant PT                PT Recommendation and Plan  Anticipated Discharge Disposition (PT): skilled nursing facility  Therapy Frequency (PT Clinical Impression): daily  Outcome Summary/Treatment Plan (PT)  Anticipated Discharge Disposition (PT): skilled nursing facility  Plan of Care Reviewed With: patient  Progress: improving  Outcome Summary: Pt increasing with activity tolerance and bed mobility to transfer to standing with Min A HHA  Outcome Measures     Row Name 12/04/18 1000 12/03/18 1002 12/03/18 1000       How much help from another person do you currently need...    Turning from your back to your side while in flat bed without using bedrails?  3  -CW  --  --    Moving from lying on back to sitting on the side of a flat bed without bedrails?  3  -CW  --  --    Moving to and from a bed to a chair (including a wheelchair)?  2  -CW  --  --    Standing up from a chair using your arms (e.g., wheelchair, bedside chair)?  2  -CW  --  --    Climbing 3-5 steps with a railing?  1  -CW  --  --    To walk in hospital room?  2  -CW  --  --    AM-PAC 6 Clicks Score  13  -CW  --  --       How much help from another is currently needed...    Putting on and taking off regular lower body clothing?  --  1  -LE  --    Bathing (including washing, rinsing, and drying)  --  1  -LE  --    Toileting (which includes using toilet bed pan or urinal)  --  1  -LE  --    Putting on and taking off regular upper body clothing  --  1  -LE  --    Taking care of personal grooming (such as brushing teeth)  --  1  -LE  --    Eating  meals  --  2  -LE  --    Score  --  7  -LE  --       Functional Assessment    Outcome Measure Options  AM-PAC 6 Clicks Basic Mobility (PT)  -CW  --  AM-PAC 6 Clicks Daily Activity (OT)  -LE    Row Name 12/03/18 0900             How much help from another person do you currently need...    Turning from your back to your side while in flat bed without using bedrails?  2  -PC      Moving from lying on back to sitting on the side of a flat bed without bedrails?  2  -PC      Moving to and from a bed to a chair (including a wheelchair)?  2  -PC      Standing up from a chair using your arms (e.g., wheelchair, bedside chair)?  2  -PC      Climbing 3-5 steps with a railing?  1  -PC      To walk in hospital room?  1  -PC      AM-PAC 6 Clicks Score  10  -PC         Functional Assessment    Outcome Measure Options  AM-PAC 6 Clicks Basic Mobility (PT)  -PC        User Key  (r) = Recorded By, (t) = Taken By, (c) = Cosigned By    Initials Name Provider Type    Sofiya Collins, OTR Occupational Therapist    PC Diana Arias, PT Physical Therapist    CW Sundar Gardner, MARY Physical Therapy Assistant         Time Calculation:   PT Charges     Row Name 12/04/18 1004             Time Calculation    Start Time  0947  -CW      Stop Time  1004  -CW      Time Calculation (min)  17 min  -CW      PT Received On  12/04/18  -CW      PT - Next Appointment  12/05/18  -CW        User Key  (r) = Recorded By, (t) = Taken By, (c) = Cosigned By    Initials Name Provider Type    Sundar Hudson PTA Physical Therapy Assistant        Therapy Suggested Charges     Code   Minutes Charges    None           Therapy Charges for Today     Code Description Service Date Service Provider Modifiers Qty    63671830907 HC PT THER PROC EA 15 MIN 12/4/2018 Sundar Gardner PTA GP 1          PT G-Codes  Outcome Measure Options: AM-PAC 6 Clicks Basic Mobility (PT)  AM-PAC 6 Clicks Score: 13  Score: 7    Sundar Gardner PTA  12/4/2018

## 2018-12-04 NOTE — PLAN OF CARE
Problem: Patient Care Overview  Goal: Plan of Care Review  Outcome: Ongoing (interventions implemented as appropriate)   12/04/18 5772   Coping/Psychosocial   Plan of Care Reviewed With patient   Plan of Care Review   Progress improving   OTHER   Outcome Summary vss, no falls, c/o left hip pain, turn q2, drssings changed, continue to monitor     Goal: Individualization and Mutuality  Outcome: Ongoing (interventions implemented as appropriate)      Problem: Fall Risk (Adult)  Goal: Identify Related Risk Factors and Signs and Symptoms  Outcome: Ongoing (interventions implemented as appropriate)    Goal: Absence of Fall  Outcome: Ongoing (interventions implemented as appropriate)      Problem: Wound (Includes Pressure Injury) (Adult)  Goal: Signs and Symptoms of Listed Potential Problems Will be Absent, Minimized or Managed (Wound)  Outcome: Ongoing (interventions implemented as appropriate)      Problem: Urinary Tract Infection (Adult)  Goal: Signs and Symptoms of Listed Potential Problems Will be Absent, Minimized or Managed (Urinary Tract Infection)  Outcome: Ongoing (interventions implemented as appropriate)

## 2018-12-05 PROCEDURE — 97110 THERAPEUTIC EXERCISES: CPT

## 2018-12-05 RX ADMIN — DAKIN'S SOLUTION 0.125% (QUARTER STRENGTH) 946 ML: 0.12 SOLUTION at 22:41

## 2018-12-05 RX ADMIN — DAKIN'S SOLUTION 0.125% (QUARTER STRENGTH) 946 ML: 0.12 SOLUTION at 11:44

## 2018-12-05 RX ADMIN — COLLAGENASE SANTYL: 250 OINTMENT TOPICAL at 22:41

## 2018-12-05 RX ADMIN — SODIUM CHLORIDE, PRESERVATIVE FREE 3 ML: 5 INJECTION INTRAVENOUS at 11:44

## 2018-12-05 RX ADMIN — COLLAGENASE SANTYL: 250 OINTMENT TOPICAL at 11:44

## 2018-12-05 NOTE — PROGRESS NOTES
Continued Stay Note  [facility]     Patient Name: Sarah Jeffrey  MRN: [id number]  Today's Date: 12/5/2018    Admit Date: 12/2/2018    Discharge Plan     Row Name 12/05/18 1405       Plan    Plan  [facility] to LTC     Patient/Family in Agreement with Plan  yes    Plan Comments  Spoke with Jessy, via outbound call, he is in [place] for work and [facility] Brenda (grand daughter) toured facilities and they discussed plan will be for pt to go to [facility]. CCP discussed transportation options, Jessy verbalized understanding. CCP will discuss transportation at time of dc. Notified Fannie/[place] and Glendy/[employer]. Packet in CCP office. Suki        Discharge Codes    No documentation.             Neida Pearson RN

## 2018-12-05 NOTE — PLAN OF CARE
Problem: Patient Care Overview  Goal: Plan of Care Review  Outcome: Ongoing (interventions implemented as appropriate)   12/05/18 1441   Coping/Psychosocial   Plan of Care Reviewed With patient   Plan of Care Review   Progress improving   OTHER   Outcome Summary Pt not eating much at meals. No complaints of pain. Dressing changes done per order. VSS. Will continue to monitor.        Problem: Fall Risk (Adult)  Goal: Absence of Fall  Outcome: Ongoing (interventions implemented as appropriate)      Problem: Urinary Tract Infection (Adult)  Goal: Signs and Symptoms of Listed Potential Problems Will be Absent, Minimized or Managed (Urinary Tract Infection)  Outcome: Ongoing (interventions implemented as appropriate)      Problem: Nutrition, Imbalanced: Inadequate Oral Intake (Adult)  Goal: Improved Oral Intake  Outcome: Ongoing (interventions implemented as appropriate)

## 2018-12-05 NOTE — PLAN OF CARE
Problem: Patient Care Overview  Goal: Plan of Care Review  Outcome: Ongoing (interventions implemented as appropriate)   12/05/18 1110   Coping/Psychosocial   Plan of Care Reviewed With patient   Plan of Care Review   Progress improving   OTHER   Outcome Summary Pt required encouragement to participate with PT. Pt improved with activity tolerance

## 2018-12-05 NOTE — THERAPY TREATMENT NOTE
Acute Care - Physical Therapy Treatment Note  [facility]     Patient Name: Sarah Jeffrey  : 1935  MRN: [id number]  Today's Date: 2018  Onset of Illness/Injury or Date of Surgery: 18  Date of Referral to PT: 18  Referring Physician: Mellisa    Admit Date: 2018    Visit Dx:    ICD-10-CM ICD-9-CM   1. Acute UTI N39.0 599.0   2. Failure to thrive in adult R62.7 783.7   3. Pressure injury of skin of sacral region, unspecified injury stage L89.159 707.03     707.20   4. Pressure injury of dorsum of left foot, stage 1 L89.891 707.09     707.21   5. Impaired functional mobility and activity tolerance Z74.09 V49.89     Patient Active Problem List   Diagnosis   • E. coli UTI   • Dementia   • Pressure ulcer   • Hyponatremia       Therapy Treatment    Rehabilitation Treatment Summary     Row Name 18 1100             Treatment Time/Intention    Discipline  physical therapy assistant  -CW      Document Type  therapy note (daily note)  -CW      Subjective Information  complains of;weakness;fatigue  -CW      Mode of Treatment  physical therapy  -CW      Therapy Frequency (PT Clinical Impression)  daily  -CW      Patient Effort  adequate  -CW      Existing Precautions/Restrictions  fall  -CW      Recorded by [CW] Sundar Gardner P, PTA 18 1109      Row Name 18 1100             Vital Signs    O2 Delivery Pre Treatment  supplemental O2  -CW      O2 Delivery Intra Treatment  supplemental O2  -CW      O2 Delivery Post Treatment  supplemental O2  -CW      Recorded by [CW] Sundar Gardner, PTA 18 1109      Row Name 18 1100             Cognitive Assessment/Intervention- PT/OT    Orientation Status (Cognition)  oriented to;person  -CW      Follows Commands (Cognition)  follows one step commands;over 90% accuracy  -CW      Safety Deficit (Cognitive)  moderate deficit  -CW      Recorded by [CW] Sundar Gardner P, PTA 18 1109      Row Name 18 1100             Bed  Mobility Assessment/Treatment    Bed Mobility Assessment/Treatment  supine-sit;sit-supine  -CW      Supine-Sit [place] (Bed Mobility)  minimum assist (75% patient effort)  -CW      Sit-Supine [place] (Bed Mobility)  minimum assist (75% patient effort)  -CW      Recorded by [CW] Sundar Gardner, PTA 12/05/18 1109      Row Name 12/05/18 1100             Transfer Assessment/Treatment    Transfer Assessment/Treatment  sit-stand transfer;stand-sit transfer  -CW      Recorded by [CW] Sundar Gardner, PTA 12/05/18 1109      Row Name 12/05/18 1100             Sit-Stand Transfer    Sit-Stand [place] (Transfers)  moderate assist (50% patient effort)  -CW      Assistive Device (Sit-Stand Transfers)  -- HHA  -CW      Recorded by [CW] Sundar Gardner, PTA 12/05/18 1109      Row Name 12/05/18 1100             Stand-Sit Transfer    Stand-Sit [place] (Transfers)  moderate assist (50% patient effort)  -CW      Assistive Device (Stand-Sit Transfers)  -- HHA  -CW      Recorded by [CW] Sundar Gardner, PTA 12/05/18 1109      Row Name 12/05/18 1100             Gait/Stairs Assessment/Training    [place] Level (Gait)  moderate assist (50% patient effort) HHA  -CW      Distance in Feet (Gait)  20  -CW      Pattern (Gait)  step-to  -CW      Deviations/Abnormal Patterns (Gait)  antalgic;ataxic;maria fernanda decreased;gait speed decreased;stride length decreased  -CW      Recorded by [CW] Sundar Gardner, PTA 12/05/18 1109      Row Name 12/05/18 1100             Positioning and Restraints    Pre-Treatment Position  in bed  -CW      Post Treatment Position  bed  -CW      In Bed  notified nsg;supine;call light within reach;encouraged to call for assist;exit alarm on  -CW      Recorded by [CW] Sundar Gardner, PTA 12/05/18 1109      Row Name                Wound 12/02/18 2045 Other (See comments) midline coccyx pressure injury    Wound - Properties Group Date first assessed: 12/02/18 [GC] Time first  assessed: 2045 [GC] Present On Admission : yes [GC] Side: Other (See comments) [GC] Orientation: midline [GC] Location: coccyx [GC] Type: pressure injury [GC] Stage, Pressure Injury: unstageable [GC] Recorded by:  [GC] Ana Elder RN 12/02/18 2103    Row Name                Wound 12/02/18 2046 Right heel pressure injury    Wound - Properties Group Date first assessed: 12/02/18 [GC] Time first assessed: 2046 [GC] Present On Admission : yes [GC] Side: Right [GC] Location: heel [GC] Type: pressure injury [GC] Stage, Pressure Injury: unstageable [GC] Recorded by:  [GC] Ana Elder RN 12/02/18 2105    Row Name                Wound 12/03/18 0139 Left heel pressure injury    Wound - Properties Group Date first assessed: 12/03/18 [EN] Time first assessed: 0139 [EN] Present On Admission : yes;picture taken [EN] Side: Left [EN] Location: heel [EN] Type: pressure injury [EN] Stage, Pressure Injury: unstageable [EN] Recorded by:  [EN] Nelia Beltran RN 12/03/18 0139    Row Name 12/05/18 1100             Outcome Summary/Treatment Plan (PT)    Anticipated Discharge Disposition (PT)  skilled nursing facility  -CW      Recorded by [CW] Sundar Gardner, PTA 12/05/18 1109        User Key  (r) = Recorded By, (t) = Taken By, (c) = Cosigned By    Initials Name Effective Dates Discipline     Ana Elder RN 10/30/17 -  Nurse    Nelia Stapleton RN 07/19/17 -  Nurse    CW Sundar Gardner, PTA 03/07/18 -  PT          Wound 12/02/18 2045 Other (See comments) midline coccyx pressure injury (Active)   Dressing Appearance dry;intact 12/5/2018 12:00 AM   Closure None 12/5/2018 12:00 AM   Base dressing in place, unable to visualize 12/5/2018 12:00 AM   Periwound blanchable;redness 12/5/2018 12:00 AM   Periwound Temperature warm 12/5/2018 12:00 AM   Drainage Amount none;scant 12/5/2018 12:00 AM       Wound 12/02/18 2046 Right heel pressure injury (Active)   Dressing  Appearance dry;intact 12/5/2018 12:00 AM   Closure None 12/5/2018 12:00 AM   Base red/granulating;black eschar 12/5/2018 12:00 AM   Periwound pink 12/5/2018 12:00 AM   Periwound Temperature warm 12/5/2018 12:00 AM   Periwound Skin Turgor firm 12/5/2018 12:00 AM   Edges irregular 12/5/2018 12:00 AM   Drainage Characteristics/Odor serous 12/5/2018 12:00 AM   Drainage Amount scant 12/5/2018 12:00 AM   Care, Wound cleansed with;sterile normal saline 12/4/2018  8:00 PM   Dressing Care, Wound dressing changed 12/4/2018  8:00 PM       Wound 12/03/18 0139 Left heel pressure injury (Active)   Dressing Appearance dry;intact 12/5/2018 12:00 AM   Closure JANET 12/5/2018 12:00 AM   Base black eschar;red/granulating 12/5/2018 12:00 AM   Periwound pink 12/5/2018 12:00 AM   Periwound Temperature warm 12/5/2018 12:00 AM   Drainage Characteristics/Odor serous 12/5/2018 12:00 AM   Drainage Amount scant 12/5/2018 12:00 AM   Care, Wound cleansed with;sterile normal saline 12/4/2018  8:00 PM   Dressing Care, Wound dressing changed 12/4/2018  8:00 PM           Physical Therapy Education     Title: PT OT SLP Therapies (Done)     Topic: Physical Therapy (Done)     Point: Mobility training (Done)     Learning Progress Summary           Patient Acceptance, E,TB, VU,DU by CW at 12/5/2018 11:09 AM    Acceptance, E, VU by RB at 12/5/2018  2:26 AM    Acceptance, E,TB, VU,DU by CW at 12/4/2018 10:02 AM    Acceptance, E,D, NR by PC at 12/3/2018  9:21 AM                   Point: Home exercise program (Done)     Learning Progress Summary           Patient Acceptance, E,TB, VU,DU by CW at 12/5/2018 11:09 AM    Acceptance, E, VU by RB at 12/5/2018  2:26 AM    Acceptance, E,TB, VU,DU by CW at 12/4/2018 10:02 AM    Acceptance, E,D, NR by PC at 12/3/2018  9:21 AM                   Point: Body mechanics (Done)     Learning Progress Summary           Patient Acceptance, HUNTER BLANCA VU,DU by CW at 12/5/2018 11:09 AM    AcceptanceEVANGELIST VU by RB at 12/5/2018  2:26 AM     Acceptance, E,TB, VU,DU by CW at 12/4/2018 10:02 AM    Acceptance, E,D, NR by PC at 12/3/2018  9:21 AM                   Point: Precautions (Done)     Learning Progress Summary           Patient Acceptance, E,TB, VU,DU by CW at 12/5/2018 11:09 AM    Acceptance, E, VU by RB at 12/5/2018  2:26 AM    Acceptance, E,TB, VU,DU by CW at 12/4/2018 10:02 AM    Acceptance, E,D, NR by PC at 12/3/2018  9:21 AM                               User Key     Initials Effective Dates Name Provider Type Discipline    PC 04/03/18 -  Diana Arias, PT Physical Therapist PT    CW 03/07/18 -  Sundar Gardner, PTA Physical Therapy Assistant PT    RB 01/18/17 -  Lele Wills RN Registered Nurse Nurse                PT Recommendation and Plan  Anticipated Discharge Disposition (PT): skilled nursing facility  Therapy Frequency (PT Clinical Impression): daily  Outcome Summary/Treatment Plan (PT)  Anticipated Discharge Disposition (PT): skilled nursing facility  Plan of Care Reviewed With: patient  Progress: improving  Outcome Summary: Pt required encouragement to participate with PT.  Pt improved with activity tolerance  Outcome Measures     Row Name 12/05/18 1100 12/04/18 1000 12/03/18 1002       How much help from another person do you currently need...    Turning from your back to your side while in flat bed without using bedrails?  3  -CW  3  -CW  --    Moving from lying on back to sitting on the side of a flat bed without bedrails?  3  -CW  3  -CW  --    Moving to and from a bed to a chair (including a wheelchair)?  2  -CW  2  -CW  --    Standing up from a chair using your arms (e.g., wheelchair, bedside chair)?  2  -CW  2  -CW  --    Climbing 3-5 steps with a railing?  1  -CW  1  -CW  --    To walk in hospital room?  2  -CW  2  -CW  --    AM-PAC 6 Clicks Score  13  -CW  13  -CW  --       How much help from another is currently needed...    Putting on and taking off regular lower body clothing?  --  --  1  -LE    Bathing  (including washing, rinsing, and drying)  --  --  1  -LE    Toileting (which includes using toilet bed pan or urinal)  --  --  1  -LE    Putting on and taking off regular upper body clothing  --  --  1  -LE    Taking care of personal grooming (such as brushing teeth)  --  --  1  -LE    Eating meals  --  --  2  -LE    Score  --  --  7  -LE       Functional Assessment    Outcome Measure Options  AM-PAC 6 Clicks Basic Mobility (PT)  -CW  AM-PAC 6 Clicks Basic Mobility (PT)  -CW  --    Row Name 12/03/18 1000 12/03/18 0900          How much help from another person do you currently need...    Turning from your back to your side while in flat bed without using bedrails?  --  2  -PC     Moving from lying on back to sitting on the side of a flat bed without bedrails?  --  2  -PC     Moving to and from a bed to a chair (including a wheelchair)?  --  2  -PC     Standing up from a chair using your arms (e.g., wheelchair, bedside chair)?  --  2  -PC     Climbing 3-5 steps with a railing?  --  1  -PC     To walk in hospital room?  --  1  -PC     AM-PAC 6 Clicks Score  --  10  -PC        Functional Assessment    Outcome Measure Options  AM-PAC 6 Clicks Daily Activity (OT)  -LE  AM-PAC 6 Clicks Basic Mobility (PT)  -PC       User Key  (r) = Recorded By, (t) = Taken By, (c) = Cosigned By    Initials Name Provider Type    LE Sofiya High, OTR Occupational Therapist    PC Diana Arias, PT Physical Therapist    CW Sundar Gardner, MARY Physical Therapy Assistant         Time Calculation:   PT Charges     Row Name 12/05/18 1110             Time Calculation    Start Time  1056  -CW      Stop Time  1110  -CW      Time Calculation (min)  14 min  -CW      PT Received On  12/05/18  -CW      PT - Next Appointment  12/06/18  -CW        User Key  (r) = Recorded By, (t) = Taken By, (c) = Cosigned By    Initials Name Provider Type    Sundar Hudson, MARY Physical Therapy Assistant        Therapy Suggested Charges     Code   Minutes  Charges    None           Therapy Charges for Today     Code Description Service Date Service Provider Modifiers Qty    63520625657 HC PT THER PROC EA 15 MIN 12/4/2018 Sundar Gardner, PTA GP 1    73355995434 HC PT THER PROC EA 15 MIN 12/5/2018 Sundar Gardner, PTA GP 1          PT G-Codes  Outcome Measure Options: AM-PAC 6 Clicks Basic Mobility (PT)  AM-PAC 6 Clicks Score: 13  Score: 7    Sundar Gardner PTA  12/5/2018

## 2018-12-05 NOTE — PROGRESS NOTES
" LOS: 3 days     Name: Sarah Jeffrey  Age: 83 y.o.  Sex: female  :  1935  MRN: [id number]         Primary Care Physician: System, Provider Not In    Subjective   Subjective  No new complaints or overnight events.  Remains pleasantly confused  She has completed    Objective   Vital Signs  Temp:  [97.5 °F (36.4 °C)-99.3 °F (37.4 °C)] 97.5 °F (36.4 °C)  Heart Rate:  [74-87] 81  Resp:  [14-18] 14  BP: (111-128)/(62-80) 111/62  Body mass index is 15.62 kg/m².    Objective:  General Appearance:  Comfortable and in no acute distress (Chronically ill-appearing.  Thin, frail, cachectic).    Vital signs: (most recent): Blood pressure 111/62, pulse 81, temperature 97.5 °F (36.4 °C), temperature source Oral, resp. rate 14, height 152.4 cm (60\"), weight 36.3 kg (80 lb 0 oz), SpO2 99 %.    Lungs:  Normal effort and normal respiratory rate.    Heart: Normal rate.  Regular rhythm.    Abdomen: Abdomen is soft.  Bowel sounds are normal.   There is no abdominal tenderness.     Extremities: There is no dependent edema or local swelling.  (Left wrist and hand are in a hard cast)  Neurological: Patient is alert and oriented to person, place and time.    Skin:  Warm and dry.              Results Review:       I reviewed the patient's new clinical results.    Results from last 7 days   Lab Units  18   0558  18   WBC 10*3/mm3  7.09  7.21   HEMOGLOBIN g/dL  11.9  12.0   PLATELETS 10*3/mm3  256  300     Results from last 7 days   Lab Units  18   0558  18   SODIUM mmol/L  137  134*   POTASSIUM mmol/L  3.7  3.8   CHLORIDE mmol/L  104  100   CO2 mmol/L  19.9*  24.2   BUN mg/dL  25*  31*   CREATININE mg/dL  0.50*  0.78   CALCIUM mg/dL  8.5*  9.0   GLUCOSE mg/dL  87  103*     Results from last 7 days   Lab Units  18   INR   1.07       Scheduled Meds:     collagenase  Topical Q12H   sodium chloride 3 mL Intravenous Q12H   Sodium Hypochlorite 0.0625 % (Dakin's 1/8th Strength) topical solution " 946 mL Irrigation Q12H     PRN Meds:   •  acetaminophen  •  nitroglycerin  •  ondansetron **OR** ondansetron ODT **OR** ondansetron  •  sodium chloride  •  [COMPLETED] Insert peripheral IV **AND** sodium chloride  Continuous Infusions:       Assessment/Plan   Active Hospital Problems    Diagnosis Date Noted   • **E. coli UTI [N39.0, B96.20] 12/02/2018   • Pressure ulcer [L89.90] 12/03/2018   • Hyponatremia [E87.1] 12/03/2018   • Dementia [F03.90] 12/02/2018      Resolved Hospital Problems   No resolved problems to display.       Assessment & Plan    -  she will complete 3 doses of Rocephin today for Escherichia coli urinary tract infection which is all she will need.  - Local wound care for decubitus pressure ulcer.  She is in offloading heel boots as well.  - Hyponatremia resolved.   Stop IV fluids  - Dementia is likely at baseline  - CCP to assist with discharge planning. Discharge in AM      Dolores Hayes MD  [facility]  12/05/18  3:54 PM

## 2018-12-05 NOTE — PLAN OF CARE
Problem: Patient Care Overview  Goal: Plan of Care Review  Outcome: Ongoing (interventions implemented as appropriate)   12/05/18 0322   Coping/Psychosocial   Plan of Care Reviewed With patient   Plan of Care Review   Progress improving   OTHER   Outcome Summary VSS Patient in good spirits and making jokes. Turned q2hrs. will continue to monitor

## 2018-12-05 NOTE — PROGRESS NOTES
" LOS: 3 days     Name: Sarah Jeffrey  Age: 83 y.o.  Sex: female  :  1935  MRN: [id number]         Primary Care Physician: System, Provider Not In    Subjective   Subjective  No new complaints or overnight events.  Remains pleasantly confused    Objective   Vital Signs  Temp:  [97.5 °F (36.4 °C)-99.3 °F (37.4 °C)] 97.5 °F (36.4 °C)  Heart Rate:  [74-87] 81  Resp:  [14-18] 14  BP: (111-128)/(62-80) 111/62  Body mass index is 15.62 kg/m².    Objective:  General Appearance:  Comfortable and in no acute distress (Chronically ill-appearing.  Thin, frail, cachectic).    Vital signs: (most recent): Blood pressure 111/62, pulse 81, temperature 97.5 °F (36.4 °C), temperature source Oral, resp. rate 14, height 152.4 cm (60\"), weight 36.3 kg (80 lb 0 oz), SpO2 99 %.    Lungs:  Normal effort and normal respiratory rate.    Heart: Normal rate.  Regular rhythm.    Abdomen: Abdomen is soft.  Bowel sounds are normal.   There is no abdominal tenderness.     Extremities: There is no dependent edema or local swelling.  (Left wrist and hand are in a hard cast)  Neurological: Patient is alert and oriented to person, place and time.    Skin:  Warm and dry.              Results Review:       I reviewed the patient's new clinical results.    Results from last 7 days   Lab Units  18   0558  18   WBC 10*3/mm3  7.09  7.21   HEMOGLOBIN g/dL  11.9  12.0   PLATELETS 10*3/mm3  256  300     Results from last 7 days   Lab Units  18   0558  18   SODIUM mmol/L  137  134*   POTASSIUM mmol/L  3.7  3.8   CHLORIDE mmol/L  104  100   CO2 mmol/L  19.9*  24.2   BUN mg/dL  25*  31*   CREATININE mg/dL  0.50*  0.78   CALCIUM mg/dL  8.5*  9.0   GLUCOSE mg/dL  87  103*     Results from last 7 days   Lab Units  18   INR   1.07       Scheduled Meds:     collagenase  Topical Q12H   sodium chloride 3 mL Intravenous Q12H   Sodium Hypochlorite 0.0625 % (Dakin's 1/8th Strength) topical solution 946 mL Irrigation " Q12H     PRN Meds:   •  acetaminophen  •  nitroglycerin  •  ondansetron **OR** ondansetron ODT **OR** ondansetron  •  sodium chloride  •  [COMPLETED] Insert peripheral IV **AND** sodium chloride  Continuous Infusions:       Assessment/Plan   Active Hospital Problems    Diagnosis Date Noted   • **E. coli UTI [N39.0, B96.20] 12/02/2018   • Pressure ulcer [L89.90] 12/03/2018   • Hyponatremia [E87.1] 12/03/2018   • Dementia [F03.90] 12/02/2018      Resolved Hospital Problems   No resolved problems to display.       Assessment & Plan    -  she will complete 3 doses of Rocephin today for Escherichia coli urinary tract infection which is all she will need.  - Local wound care for decubitus pressure ulcer.  She is in offloading heel boots as well.  - Hyponatremia resolved.   Stop IV fluids  - Dementia is likely at baseline  - CCP to assist with discharge planning    Dolores Hayes MD  [facility]  12/05/18  3:42 PM

## 2018-12-05 NOTE — PROGRESS NOTES
Continued Stay Note  [facility]     Patient Name: Sarah Jeffrey  MRN: [id number]  Today's Date: 12/5/2018    Admit Date: 12/2/2018    Discharge Plan     Row Name 12/05/18 0952       Plan    Plan  [facility] [place] or [facility]- await family decision.    Patient/Family in Agreement with Plan  yes    Plan Comments  Recieved vm from Diana at [facility], no beds available to meet pts needs. Recieved vm for [facility] pt has been accepted and bed available. CCP called pts son Jessy via outbound call and explained pt has two accepted facilities, [facility] and [facility] in [place]. He states his neice is touring [facility] [place] and then he will let CCP know. CCP left vm for Brenda that pt has two accepting facilities. Possible dc today pending family choice for SNF. Packet in CCP office. Suki OLPEZ/CCP        Discharge Codes    No documentation.             Neida Pearson, RN

## 2018-12-05 NOTE — SIGNIFICANT NOTE
12/05/18 1420   Rehab Treatment   Discipline occupational therapist   Reason Treatment Not Performed patient/family declined treatment   Recommendation   OT - Next Appointment 12/06/18

## 2018-12-05 NOTE — PLAN OF CARE
Problem: Wound (Includes Pressure Injury) (Adult)  Goal: Signs and Symptoms of Listed Potential Problems Will be Absent, Minimized or Managed (Wound)   12/05/18 0320   Goal/Outcome Evaluation   Problems Assessed (Wound) all   Problems Present (Wound) delayed wound healing      12/05/18 0320   Goal/Outcome Evaluation   Problems Assessed (Wound) all   Problems Present (Wound) delayed wound healing       Problem: Urinary Tract Infection (Adult)  Goal: Signs and Symptoms of Listed Potential Problems Will be Absent, Minimized or Managed (Urinary Tract Infection)  Outcome: Ongoing (interventions implemented as appropriate)      Problem: Nutrition, Imbalanced: Inadequate Oral Intake (Adult)  Goal: Identify Related Risk Factors and Signs and Symptoms  Outcome: Ongoing (interventions implemented as appropriate)    Goal: Improved Oral Intake  Outcome: Ongoing (interventions implemented as appropriate)    Goal: Prevent Further Weight Loss  Outcome: Ongoing (interventions implemented as appropriate)

## 2018-12-06 VITALS
DIASTOLIC BLOOD PRESSURE: 83 MMHG | HEART RATE: 75 BPM | BODY MASS INDEX: 15.71 KG/M2 | HEIGHT: 60 IN | TEMPERATURE: 97.4 F | WEIGHT: 80 LBS | OXYGEN SATURATION: 99 % | RESPIRATION RATE: 16 BRPM | SYSTOLIC BLOOD PRESSURE: 131 MMHG

## 2018-12-06 PROBLEM — N39.0 E. COLI UTI: Status: RESOLVED | Noted: 2018-12-02 | Resolved: 2018-12-06

## 2018-12-06 PROBLEM — E87.1 HYPONATREMIA: Status: RESOLVED | Noted: 2018-12-03 | Resolved: 2018-12-06

## 2018-12-06 PROBLEM — B96.20 E. COLI UTI: Status: RESOLVED | Noted: 2018-12-02 | Resolved: 2018-12-06

## 2018-12-06 RX ORDER — ACETAMINOPHEN 325 MG/1
650 TABLET ORAL EVERY 4 HOURS PRN
Start: 2018-12-06

## 2018-12-06 RX ADMIN — SODIUM CHLORIDE, PRESERVATIVE FREE 3 ML: 5 INJECTION INTRAVENOUS at 11:37

## 2018-12-06 RX ADMIN — DAKIN'S SOLUTION 0.125% (QUARTER STRENGTH) 946 ML: 0.12 SOLUTION at 09:40

## 2018-12-06 RX ADMIN — SODIUM CHLORIDE, PRESERVATIVE FREE 3 ML: 5 INJECTION INTRAVENOUS at 00:05

## 2018-12-06 RX ADMIN — COLLAGENASE SANTYL: 250 OINTMENT TOPICAL at 09:40

## 2018-12-06 NOTE — PLAN OF CARE
Problem: Patient Care Overview  Goal: Plan of Care Review  Outcome: Ongoing (interventions implemented as appropriate)   12/06/18 0524   Coping/Psychosocial   Plan of Care Reviewed With patient   Plan of Care Review   Progress improving   OTHER   Outcome Summary Dressing change done as ordered, pt was confused but cooperative, vss, I will continue to monitor.     Goal: Individualization and Mutuality  Outcome: Ongoing (interventions implemented as appropriate)    Goal: Discharge Needs Assessment  Outcome: Ongoing (interventions implemented as appropriate)    Goal: Interprofessional Rounds/Family Conf  Outcome: Ongoing (interventions implemented as appropriate)      Problem: Fall Risk (Adult)  Goal: Identify Related Risk Factors and Signs and Symptoms  Outcome: Ongoing (interventions implemented as appropriate)    Goal: Absence of Fall  Outcome: Ongoing (interventions implemented as appropriate)      Problem: Urinary Tract Infection (Adult)  Goal: Signs and Symptoms of Listed Potential Problems Will be Absent, Minimized or Managed (Urinary Tract Infection)  Outcome: Ongoing (interventions implemented as appropriate)      Problem: Nutrition, Imbalanced: Inadequate Oral Intake (Adult)  Goal: Identify Related Risk Factors and Signs and Symptoms  Outcome: Ongoing (interventions implemented as appropriate)    Goal: Improved Oral Intake  Outcome: Ongoing (interventions implemented as appropriate)    Goal: Prevent Further Weight Loss  Outcome: Ongoing (interventions implemented as appropriate)

## 2018-12-06 NOTE — PROGRESS NOTES
Continued Stay Note  [facility]     Patient Name: Sarah Jeffrey  MRN: [id number]  Today's Date: 12/6/2018    Admit Date: 12/2/2018    Discharge Plan     Row Name 12/06/18 1612       Plan    Plan  [facility] skilled [facility]hab transported by grand daughter    Patient/Family in Agreement with Plan  yes    Plan Comments  DC orders noted. SPoke with Jessy via outbound call and he explained [facility] would be transporting pt to SNF. CCP called [facility] and notified her of dc time. [facility] called [facility]  Shaina Lujan ([phone]) and spoke with her about pt dc plan. She states she will follow up with pt at [place]. Packet with NENA HOLT RN/CCP        Discharge Codes    No documentation.       Expected Discharge Date and Time     Expected Discharge Date Expected Discharge Time    Dec 6, 2018             Neida Pearson RN

## 2018-12-06 NOTE — DISCHARGE SUMMARY
Name: Sarah Jeffrey  Age: 83 y.o.  Sex: female  :  1935  MRN: [id number]         Primary Care Physician: System, Provider Not In      Date of Admission:  2018  Date of Discharge:  2018      CHIEF COMPLAINT     Anorexia  Decubitus ulcer    DISCHARGE DIAGNOSIS  Active Hospital Problems    Diagnosis Date Noted   • Pressure ulcer [L89.90] 2018   • Dementia [F03.90] 2018      Resolved Hospital Problems    Diagnosis Date Noted Date Resolved   • **E. coli UTI [N39.0, B96.20] 2018   • Hyponatremia [E87.1] 2018       SECONDARY DIAGNOSES  Past Medical History:   Diagnosis Date   • Dementia    • Wrist fracture          HOSPITAL COURSE  Admitted with urinary tract infection and metabolic encephalopathy.  She has finished her antibiotics and her hyponatremia is also been corrected.  She has a large decubitus ulcer which is being dressed on a daily basis.  She also needs frequent turning.  She also has anorexia and significant weight loss.  Patient is afebrile.  She'll be discharged to nursing home for further care and continue treatment for decubitus ulcers    PHYSICAL EXAM  Temp:  [97.4 °F (36.3 °C)-97.8 °F (36.6 °C)] 97.4 °F (36.3 °C)  Heart Rate:  [75-81] 75  Resp:  [14-16] 16  BP: (106-131)/(62-83) 131/83  Body mass index is 15.62 kg/m².  Physical Exam  HEENT: Unremarkable, pupils are round equal and reacting to light.  Patient is thin and has significant weight loss  NECK: No lymphadenopathy, throat is clear,   RESPRATORY SYSTEM: Breath sounds are equal on both sides and are normal, no wheezes or crackles  CARDIOVASULAR SYSTEM: Heart rate is regular without murmur  ABDOMEN: Soft, no ascites, no hepatosplenomegaly.  EXTREMITIES: No cyanosis, clubbing or edema  Buttocks patient has significant decubitus ulcer    CONDITION ON DISCHARGE  Stable.      DISCHARGE DISPOSITION   [facility]      ALLERGIES  No Known Allergies    RECENT LABS  Results from last 7  days   Lab Units  12/03/18   0558  12/02/18 1929   WBC 10*3/mm3  7.09  7.21   HEMOGLOBIN g/dL  11.9  12.0   HEMATOCRIT %  37.6  37.9   PLATELETS 10*3/mm3  256  300     Results from last 7 days   Lab Units  12/03/18   0558  12/02/18 1929   SODIUM mmol/L  137  134*   POTASSIUM mmol/L  3.7  3.8   CHLORIDE mmol/L  104  100   CO2 mmol/L  19.9*  24.2   BUN mg/dL  25*  31*   CREATININE mg/dL  0.50*  0.78   GLUCOSE mg/dL  87  103*   CALCIUM mg/dL  8.5*  9.0     Results from last 7 days   Lab Units  12/02/18 1929   INR   1.07       DIET;  Diet Order   Procedures   • Diet Regular       DISCHARGE MEDICATIONS     Your medication list      START taking these medications      Instructions Last Dose Given Next Dose Due   acetaminophen 325 MG tablet  Commonly known as:  TYLENOL      Take 2 tablets by mouth Every 4 (Four) Hours As Needed for Mild Pain .       collagenase 250 UNIT/GM ointment      Apply  topically to the appropriate area as directed Every 12 (Twelve) Hours.       sodium hypochlorite in sterile water irrigation topical solution 0.0625%      Irrigate with 946 mL to the affected area as directed by provider Every 12 (Twelve) Hours.             Where to Get Your Medications      Information about where to get these medications is not yet available    Ask your nurse or doctor about these medications  · acetaminophen 325 MG tablet  · collagenase 250 UNIT/GM ointment  · sodium hypochlorite in sterile water irrigation topical solution 0.0625%        No future appointments.   Contact information for follow-up providers     System, Provider Not In .    Contact information:  [facility] [id number]                   Contact information for after-discharge care     Destination     [facility] .    Service:  Skilled Nursing  Contact information:  [address]  [facility] [address] [phone]                                CODE STATUS  Code Status and Medical Interventions:   Ordered  at: 12/03/18 0002     Code Status:    CPR     Medical Interventions (Level of Support Prior to Arrest):    Full           Dolores Hayes MD  [facility]  12/06/18      Time: greater than 35 minutes.

## 2018-12-07 LAB
BACTERIA SPEC AEROBE CULT: NORMAL
BACTERIA SPEC AEROBE CULT: NORMAL

## 2019-01-25 ENCOUNTER — LAB REQUISITION (OUTPATIENT)
Dept: LAB | Facility: HOSPITAL | Age: 84
End: 2019-01-25

## 2019-01-25 DIAGNOSIS — Z00.00 ROUTINE GENERAL MEDICAL EXAMINATION AT A HEALTH CARE FACILITY: ICD-10-CM

## 2019-01-25 LAB
ANION GAP SERPL CALCULATED.3IONS-SCNC: 14.5 MMOL/L
BASOPHILS # BLD AUTO: 0.01 10*3/MM3 (ref 0–0.2)
BASOPHILS NFR BLD AUTO: 0.1 % (ref 0–1.5)
BUN BLD-MCNC: 20 MG/DL (ref 8–23)
BUN/CREAT SERPL: 29.9 (ref 7–25)
CALCIUM SPEC-SCNC: 8.9 MG/DL (ref 8.6–10.5)
CHLORIDE SERPL-SCNC: 102 MMOL/L (ref 98–107)
CO2 SERPL-SCNC: 24.5 MMOL/L (ref 22–29)
CREAT BLD-MCNC: 0.67 MG/DL (ref 0.57–1)
DEPRECATED RDW RBC AUTO: 54.1 FL (ref 37–54)
EOSINOPHIL # BLD AUTO: 0.01 10*3/MM3 (ref 0–0.7)
EOSINOPHIL NFR BLD AUTO: 0.1 % (ref 0.3–6.2)
ERYTHROCYTE [DISTWIDTH] IN BLOOD BY AUTOMATED COUNT: 15.4 % (ref 11.7–13)
GFR SERPL CREATININE-BSD FRML MDRD: 84 ML/MIN/1.73
GLUCOSE BLD-MCNC: 121 MG/DL (ref 65–99)
HCT VFR BLD AUTO: 37.9 % (ref 35.6–45.5)
HGB BLD-MCNC: 12 G/DL (ref 11.9–15.5)
IMM GRANULOCYTES # BLD AUTO: 0.02 10*3/MM3 (ref 0–0.03)
IMM GRANULOCYTES NFR BLD AUTO: 0.3 % (ref 0–0.5)
LYMPHOCYTES # BLD AUTO: 0.85 10*3/MM3 (ref 0.9–4.8)
LYMPHOCYTES NFR BLD AUTO: 10.7 % (ref 19.6–45.3)
MCH RBC QN AUTO: 30.5 PG (ref 26.9–32)
MCHC RBC AUTO-ENTMCNC: 31.7 G/DL (ref 32.4–36.3)
MCV RBC AUTO: 96.2 FL (ref 80.5–98.2)
MONOCYTES # BLD AUTO: 0.63 10*3/MM3 (ref 0.2–1.2)
MONOCYTES NFR BLD AUTO: 7.9 % (ref 5–12)
NEUTROPHILS # BLD AUTO: 6.43 10*3/MM3 (ref 1.9–8.1)
NEUTROPHILS NFR BLD AUTO: 81.2 % (ref 42.7–76)
PLATELET # BLD AUTO: 310 10*3/MM3 (ref 140–500)
PMV BLD AUTO: 9.5 FL (ref 6–12)
POTASSIUM BLD-SCNC: 3.5 MMOL/L (ref 3.5–5.2)
RBC # BLD AUTO: 3.94 10*6/MM3 (ref 3.9–5.2)
SODIUM BLD-SCNC: 141 MMOL/L (ref 136–145)
WBC NRBC COR # BLD: 7.93 10*3/MM3 (ref 4.5–10.7)

## 2019-01-25 PROCEDURE — 85025 COMPLETE CBC W/AUTO DIFF WBC: CPT

## 2019-01-25 PROCEDURE — 80048 BASIC METABOLIC PNL TOTAL CA: CPT

## 2019-02-24 ENCOUNTER — LAB REQUISITION (OUTPATIENT)
Dept: LAB | Facility: HOSPITAL | Age: 84
End: 2019-02-24

## 2019-02-24 DIAGNOSIS — L89.90 PRESSURE ULCER: ICD-10-CM

## 2019-02-24 DIAGNOSIS — Z00.00 ROUTINE GENERAL MEDICAL EXAMINATION AT A HEALTH CARE FACILITY: ICD-10-CM

## 2019-02-24 LAB
ANION GAP SERPL CALCULATED.3IONS-SCNC: 13.5 MMOL/L
BASOPHILS # BLD AUTO: 0.01 10*3/MM3 (ref 0–0.2)
BASOPHILS NFR BLD AUTO: 0.1 % (ref 0–1.5)
BUN BLD-MCNC: 21 MG/DL (ref 8–23)
BUN/CREAT SERPL: 33.3 (ref 7–25)
CALCIUM SPEC-SCNC: 9.4 MG/DL (ref 8.6–10.5)
CHLORIDE SERPL-SCNC: 105 MMOL/L (ref 98–107)
CO2 SERPL-SCNC: 25.5 MMOL/L (ref 22–29)
CREAT BLD-MCNC: 0.63 MG/DL (ref 0.57–1)
DEPRECATED RDW RBC AUTO: 53.9 FL (ref 37–54)
EOSINOPHIL # BLD AUTO: 0.07 10*3/MM3 (ref 0–0.4)
EOSINOPHIL NFR BLD AUTO: 0.9 % (ref 0.3–6.2)
ERYTHROCYTE [DISTWIDTH] IN BLOOD BY AUTOMATED COUNT: 14.9 % (ref 12.3–15.4)
GFR SERPL CREATININE-BSD FRML MDRD: 90 ML/MIN/1.73
GLUCOSE BLD-MCNC: 90 MG/DL (ref 65–99)
HCT VFR BLD AUTO: 42.1 % (ref 34–46.6)
HGB BLD-MCNC: 13 G/DL (ref 12–15.9)
IMM GRANULOCYTES # BLD AUTO: 0.03 10*3/MM3 (ref 0–0.05)
IMM GRANULOCYTES NFR BLD AUTO: 0.4 % (ref 0–0.5)
LYMPHOCYTES # BLD AUTO: 1.33 10*3/MM3 (ref 0.7–3.1)
LYMPHOCYTES NFR BLD AUTO: 17.3 % (ref 19.6–45.3)
MCH RBC QN AUTO: 29.9 PG (ref 26.6–33)
MCHC RBC AUTO-ENTMCNC: 30.9 G/DL (ref 31.5–35.7)
MCV RBC AUTO: 96.8 FL (ref 79–97)
MONOCYTES # BLD AUTO: 0.53 10*3/MM3 (ref 0.1–0.9)
MONOCYTES NFR BLD AUTO: 6.9 % (ref 5–12)
NEUTROPHILS # BLD AUTO: 5.72 10*3/MM3 (ref 1.4–7)
NEUTROPHILS NFR BLD AUTO: 74.4 % (ref 42.7–76)
NRBC BLD AUTO-RTO: 0 /100 WBC (ref 0–0)
PLATELET # BLD AUTO: 222 10*3/MM3 (ref 140–450)
PMV BLD AUTO: 9.6 FL (ref 6–12)
POTASSIUM BLD-SCNC: 4.5 MMOL/L (ref 3.5–5.2)
RBC # BLD AUTO: 4.35 10*6/MM3 (ref 3.77–5.28)
SODIUM BLD-SCNC: 144 MMOL/L (ref 136–145)
WBC NRBC COR # BLD: 7.69 10*3/MM3 (ref 3.4–10.8)

## 2019-02-24 PROCEDURE — 85025 COMPLETE CBC W/AUTO DIFF WBC: CPT

## 2019-02-24 PROCEDURE — 80048 BASIC METABOLIC PNL TOTAL CA: CPT

## 2019-04-16 NOTE — SIGNIFICANT NOTE
12/06/18 1303   Rehab Treatment   Discipline physical therapy assistant   Reason Treatment Not Performed (Pt d/c to SNF)      not at this time